# Patient Record
Sex: FEMALE | Race: WHITE | NOT HISPANIC OR LATINO | Employment: OTHER | URBAN - METROPOLITAN AREA
[De-identification: names, ages, dates, MRNs, and addresses within clinical notes are randomized per-mention and may not be internally consistent; named-entity substitution may affect disease eponyms.]

---

## 2025-03-26 PROBLEM — I50.32 CHRONIC HEART FAILURE WITH PRESERVED EJECTION FRACTION: Status: ACTIVE | Noted: 2023-06-22

## 2025-03-26 PROBLEM — I05.0 MITRAL VALVE STENOSIS, RHEUMATIC: Status: ACTIVE | Noted: 2025-03-26

## 2025-03-26 PROBLEM — E61.1 IRON DEFICIENCY: Status: ACTIVE | Noted: 2023-07-11

## 2025-03-26 PROBLEM — H93.19 TINNITUS: Status: ACTIVE | Noted: 2022-02-18

## 2025-03-26 PROBLEM — Z95.2 HISTORY OF MITRAL VALVE REPLACEMENT WITH MECHANICAL VALVE: Status: ACTIVE | Noted: 2021-12-13

## 2025-03-26 PROBLEM — R19.5 POSITIVE COLORECTAL CANCER SCREENING USING COLOGUARD TEST: Status: ACTIVE | Noted: 2023-07-11

## 2025-03-26 PROBLEM — E66.812 OBESITY, CLASS II, BMI 35-39.9: Status: ACTIVE | Noted: 2023-09-20

## 2025-03-26 PROBLEM — I51.9 HEART DISEASE: Status: ACTIVE | Noted: 2025-03-26

## 2025-03-26 PROBLEM — I36.1 TRICUSPID VALVE INSUFFICIENCY, NON-RHEUMATIC: Status: ACTIVE | Noted: 2023-06-22

## 2025-03-26 PROBLEM — I09.9 RHEUMATIC HEART DISEASE: Status: ACTIVE | Noted: 2025-03-26

## 2025-03-26 PROBLEM — G45.9 TIA (TRANSIENT ISCHEMIC ATTACK): Status: ACTIVE | Noted: 2025-03-26

## 2025-03-26 PROBLEM — N60.89 FLAT EPITHELIAL ATYPIA (FEA) OF BREAST: Status: ACTIVE | Noted: 2025-03-26

## 2025-03-26 PROBLEM — I87.2 VENOUS (PERIPHERAL) INSUFFICIENCY: Status: ACTIVE | Noted: 2023-06-22

## 2025-03-26 PROBLEM — R00.1 BRADYCARDIA: Status: ACTIVE | Noted: 2023-09-18

## 2025-03-26 PROBLEM — I48.19 PERSISTENT ATRIAL FIBRILLATION (MULTI): Status: ACTIVE | Noted: 2023-12-27

## 2025-03-26 PROBLEM — R92.8 MAMMOGRAM ABNORMAL: Status: ACTIVE | Noted: 2025-03-26

## 2025-03-26 RX ORDER — MULTIVITAMIN
1 TABLET ORAL
COMMUNITY

## 2025-03-26 RX ORDER — MOMETASONE FUROATE MONOHYDRATE 50 UG/1
2 SPRAY, METERED NASAL DAILY
COMMUNITY
Start: 2012-03-28 | End: 2025-03-30 | Stop reason: ENTERED-IN-ERROR

## 2025-03-26 RX ORDER — FLUTICASONE PROPIONATE 50 MCG
1 SPRAY, SUSPENSION (ML) NASAL DAILY
COMMUNITY
Start: 2024-10-28 | End: 2025-03-30 | Stop reason: ENTERED-IN-ERROR

## 2025-03-26 RX ORDER — CALCIUM CITRATE/VITAMIN D3 315MG-5MCG
1 TABLET ORAL DAILY
COMMUNITY

## 2025-03-26 RX ORDER — WARFARIN 3 MG/1
3 TABLET ORAL
COMMUNITY
Start: 2014-07-29

## 2025-03-26 RX ORDER — FLECAINIDE ACETATE 150 MG/1
1 TABLET ORAL EVERY 12 HOURS
COMMUNITY
Start: 2013-02-20 | End: 2025-03-27 | Stop reason: WASHOUT

## 2025-03-26 RX ORDER — DOFETILIDE 0.12 MG/1
125 CAPSULE ORAL EVERY 12 HOURS
COMMUNITY
Start: 2024-06-17

## 2025-03-26 RX ORDER — METOPROLOL SUCCINATE 50 MG/1
50 TABLET, EXTENDED RELEASE ORAL DAILY
COMMUNITY
End: 2025-03-30 | Stop reason: ENTERED-IN-ERROR

## 2025-03-26 RX ORDER — ROSUVASTATIN CALCIUM 5 MG/1
1 TABLET, COATED ORAL NIGHTLY
COMMUNITY
Start: 2024-11-04 | End: 2025-03-27 | Stop reason: WASHOUT

## 2025-03-26 RX ORDER — CALCIUM CARBONATE 300MG(750)
400 TABLET,CHEWABLE ORAL DAILY
COMMUNITY

## 2025-03-26 RX ORDER — SPIRONOLACTONE 25 MG/1
12.5 TABLET ORAL
COMMUNITY
Start: 2025-01-16

## 2025-03-27 ENCOUNTER — OFFICE VISIT (OUTPATIENT)
Dept: PRIMARY CARE | Facility: CLINIC | Age: 75
End: 2025-03-27
Payer: MEDICARE

## 2025-03-27 VITALS
DIASTOLIC BLOOD PRESSURE: 60 MMHG | SYSTOLIC BLOOD PRESSURE: 118 MMHG | HEIGHT: 66 IN | HEART RATE: 64 BPM | WEIGHT: 183 LBS | BODY MASS INDEX: 29.41 KG/M2

## 2025-03-27 DIAGNOSIS — I48.19 PERSISTENT ATRIAL FIBRILLATION (MULTI): ICD-10-CM

## 2025-03-27 DIAGNOSIS — R53.1 WEAKNESS: ICD-10-CM

## 2025-03-27 DIAGNOSIS — J32.9 CHRONIC SINUSITIS, UNSPECIFIED LOCATION: Primary | ICD-10-CM

## 2025-03-27 PROCEDURE — 1159F MED LIST DOCD IN RCRD: CPT | Performed by: FAMILY MEDICINE

## 2025-03-27 PROCEDURE — 3078F DIAST BP <80 MM HG: CPT | Performed by: FAMILY MEDICINE

## 2025-03-27 PROCEDURE — 99214 OFFICE O/P EST MOD 30 MIN: CPT | Performed by: FAMILY MEDICINE

## 2025-03-27 PROCEDURE — 1124F ACP DISCUSS-NO DSCNMKR DOCD: CPT | Performed by: FAMILY MEDICINE

## 2025-03-27 PROCEDURE — 3074F SYST BP LT 130 MM HG: CPT | Performed by: FAMILY MEDICINE

## 2025-03-27 RX ORDER — GLUCOSAMINE/CHONDR SU A SOD 750-600 MG
1 TABLET ORAL DAILY
COMMUNITY

## 2025-03-27 RX ORDER — GLUCOSAMINE/CHONDR SU A SOD 250-200 MG
1 TABLET ORAL DAILY
COMMUNITY

## 2025-03-27 RX ORDER — AZELASTINE 1 MG/ML
1 SPRAY, METERED NASAL 2 TIMES DAILY
Qty: 30 ML | Refills: 12 | Status: SHIPPED | OUTPATIENT
Start: 2025-03-27 | End: 2026-03-27

## 2025-03-27 RX ORDER — CETIRIZINE HYDROCHLORIDE 10 MG/1
10 TABLET ORAL DAILY
Qty: 30 TABLET | Refills: 5 | Status: SHIPPED | OUTPATIENT
Start: 2025-03-27 | End: 2025-09-23

## 2025-03-27 RX ORDER — MULTIVITAMIN
1 TABLET ORAL DAILY
COMMUNITY

## 2025-03-27 RX ORDER — CRANBERRY FRUIT 450 MG
1 TABLET ORAL DAILY
COMMUNITY

## 2025-03-27 ASSESSMENT — ENCOUNTER SYMPTOMS
HEADACHES: 0
DIZZINESS: 0
FEVER: 0
SHORTNESS OF BREATH: 0
ACTIVITY CHANGE: 0
FATIGUE: 0

## 2025-03-27 ASSESSMENT — PATIENT HEALTH QUESTIONNAIRE - PHQ9
2. FEELING DOWN, DEPRESSED OR HOPELESS: NOT AT ALL
1. LITTLE INTEREST OR PLEASURE IN DOING THINGS: NOT AT ALL
SUM OF ALL RESPONSES TO PHQ9 QUESTIONS 1 AND 2: 0

## 2025-03-27 NOTE — PROGRESS NOTES
"Subjective   Patient ID: Sruthi Cardenas is a 74 y.o. female who presents for New Patient Visit (Experiencing muscle weakness).    Weakness   - reports she has had issues with weakness ongoing for the last several weeks   - symptoms started in February after she had a sinus infection   - she had multiple rounds of antibiotics and a round of steroids   - after completing her second round of antibiotics she began to develop significant weakness   - the antibiotics gave her diarrhea, and she subsequently developed a UTI   - got an additional round of keflex, and has had resolution of urine and sinus symptoms   - reports she is still having persistent sinus pressure, and sinus pain around her eyes   - historically has used daily flonase to help manage her symptoms, but has been holding it for eye drops   - has had some gradual improvement in her weakness with time, but still dealing with some shortness of breath issues, and chest heaviness   - weakness is persistent, but she has some episodes that are worse then others   - does have some problems with post-nasal drip and cough   - has been sleeping poorly due to the congestion and difficulty with breathing          Review of Systems   Constitutional:  Negative for activity change, fatigue and fever.   Respiratory:  Negative for shortness of breath.    Cardiovascular:  Negative for chest pain.   Neurological:  Negative for dizziness and headaches.       Objective   /60   Pulse 64   Ht 1.664 m (5' 5.5\")   Wt 83 kg (183 lb)   BMI 29.99 kg/m²     Physical Exam  Constitutional:       Appearance: Normal appearance.   Cardiovascular:      Rate and Rhythm: Normal rate and regular rhythm.   Neurological:      Mental Status: She is alert.   Psychiatric:         Mood and Affect: Mood normal.         Behavior: Behavior normal.       Assessment/Plan   Problem List Items Addressed This Visit    None  Visit Diagnoses         Codes    Chronic sinusitis, unspecified " location    -  Primary J32.9    Relevant Medications    azelastine (Astelin) 137 mcg (0.1 %) nasal spray    cetirizine (ZyrTEC) 10 mg tablet    Weakness     R53.1    Relevant Orders    CBC and Auto Differential    Comprehensive metabolic panel    Vitamin D 25-Hydroxy,Total (for eval of Vitamin D levels)    Thyroid Stimulating Hormone    Body mass index (BMI) 30.0-30.9, adult     Z68.30    Relevant Orders    Vitamin D 25-Hydroxy,Total (for eval of Vitamin D levels)

## 2025-03-30 ENCOUNTER — APPOINTMENT (OUTPATIENT)
Dept: RADIOLOGY | Facility: HOSPITAL | Age: 75
DRG: 378 | End: 2025-03-30
Payer: MEDICARE

## 2025-03-30 ENCOUNTER — HOSPITAL ENCOUNTER (INPATIENT)
Facility: HOSPITAL | Age: 75
DRG: 378 | End: 2025-03-30
Attending: STUDENT IN AN ORGANIZED HEALTH CARE EDUCATION/TRAINING PROGRAM | Admitting: INTERNAL MEDICINE
Payer: MEDICARE

## 2025-03-30 VITALS
HEART RATE: 66 BPM | RESPIRATION RATE: 18 BRPM | SYSTOLIC BLOOD PRESSURE: 157 MMHG | HEIGHT: 67 IN | DIASTOLIC BLOOD PRESSURE: 70 MMHG | TEMPERATURE: 97.3 F | OXYGEN SATURATION: 98 % | BODY MASS INDEX: 27.78 KG/M2 | WEIGHT: 177 LBS

## 2025-03-30 DIAGNOSIS — D50.0 BLOOD LOSS ANEMIA: ICD-10-CM

## 2025-03-30 DIAGNOSIS — K92.2 ACUTE LOWER GASTROINTESTINAL HEMORRHAGE: Primary | ICD-10-CM

## 2025-03-30 LAB
25(OH)D3+25(OH)D2 SERPL-MCNC: 36 NG/ML (ref 30–100)
ABO GROUP (TYPE) IN BLOOD: NORMAL
ABO GROUP (TYPE) IN BLOOD: NORMAL
ALBUMIN SERPL BCP-MCNC: 3.7 G/DL (ref 3.4–5)
ALBUMIN SERPL-MCNC: 3.8 G/DL (ref 3.6–5.1)
ALP SERPL-CCNC: 55 U/L (ref 33–136)
ALP SERPL-CCNC: 60 U/L (ref 37–153)
ALT SERPL W P-5'-P-CCNC: 9 U/L (ref 7–45)
ALT SERPL-CCNC: 10 U/L (ref 6–29)
ANION GAP SERPL CALC-SCNC: 12 MMOL/L (ref 10–20)
ANION GAP SERPL CALCULATED.4IONS-SCNC: 8 MMOL/L (CALC) (ref 7–17)
ANTIBODY SCREEN: NORMAL
APTT PPP: 35 SECONDS (ref 26–36)
AST SERPL W P-5'-P-CCNC: 17 U/L (ref 9–39)
AST SERPL-CCNC: 18 U/L (ref 10–35)
BASOPHILS # BLD AUTO: 0.05 X10*3/UL (ref 0–0.1)
BASOPHILS # BLD AUTO: 39 CELLS/UL (ref 0–200)
BASOPHILS NFR BLD AUTO: 0.6 %
BASOPHILS NFR BLD AUTO: 1 %
BILIRUB SERPL-MCNC: 0.3 MG/DL (ref 0–1.2)
BILIRUB SERPL-MCNC: 0.4 MG/DL (ref 0.2–1.2)
BLOOD EXPIRATION DATE: NORMAL
BUN SERPL-MCNC: 25 MG/DL (ref 7–25)
BUN SERPL-MCNC: 26 MG/DL (ref 6–23)
CALCIUM SERPL-MCNC: 9 MG/DL (ref 8.6–10.3)
CALCIUM SERPL-MCNC: 9.2 MG/DL (ref 8.6–10.4)
CHLORIDE SERPL-SCNC: 106 MMOL/L (ref 98–107)
CHLORIDE SERPL-SCNC: 107 MMOL/L (ref 98–110)
CO2 SERPL-SCNC: 24 MMOL/L (ref 20–32)
CO2 SERPL-SCNC: 25 MMOL/L (ref 21–32)
CREAT SERPL-MCNC: 0.9 MG/DL (ref 0.5–1.05)
CREAT SERPL-MCNC: 0.9 MG/DL (ref 0.6–1)
DISPENSE STATUS: NORMAL
EGFRCR SERPLBLD CKD-EPI 2021: 66 ML/MIN/1.73M*2
EGFRCR SERPLBLD CKD-EPI 2021: 66 ML/MIN/1.73M2
EOSINOPHIL # BLD AUTO: 0.12 X10*3/UL (ref 0–0.4)
EOSINOPHIL # BLD AUTO: 117 CELLS/UL (ref 15–500)
EOSINOPHIL NFR BLD AUTO: 1.8 %
EOSINOPHIL NFR BLD AUTO: 2.5 %
ERYTHROCYTE [DISTWIDTH] IN BLOOD BY AUTOMATED COUNT: 17.7 % (ref 11–15)
ERYTHROCYTE [DISTWIDTH] IN BLOOD BY AUTOMATED COUNT: 19.2 % (ref 11.5–14.5)
GIANT PLATELETS BLD QL SMEAR: NORMAL
GLUCOSE SERPL-MCNC: 132 MG/DL (ref 65–139)
GLUCOSE SERPL-MCNC: 141 MG/DL (ref 74–99)
HCT VFR BLD AUTO: 20.2 % (ref 36–46)
HCT VFR BLD AUTO: 20.8 % (ref 35–45)
HGB BLD-MCNC: 5.5 G/DL (ref 12–16)
HGB BLD-MCNC: 5.6 G/DL (ref 11.7–15.5)
HYPOCHROMIA BLD QL SMEAR: NORMAL
IMM GRANULOCYTES # BLD AUTO: 0.03 X10*3/UL (ref 0–0.5)
IMM GRANULOCYTES NFR BLD AUTO: 0.6 % (ref 0–0.9)
INR PPP: 3.1 (ref 0.9–1.1)
IRON SATN MFR SERPL: 3 % (ref 25–45)
IRON SERPL-MCNC: 11 UG/DL (ref 35–150)
LYMPHOCYTES # BLD AUTO: 0.87 X10*3/UL (ref 0.8–3)
LYMPHOCYTES # BLD AUTO: 845 CELLS/UL (ref 850–3900)
LYMPHOCYTES NFR BLD AUTO: 13 %
LYMPHOCYTES NFR BLD AUTO: 17.9 %
MAGNESIUM SERPL-MCNC: 2.09 MG/DL (ref 1.6–2.4)
MCH RBC QN AUTO: 24.2 PG (ref 26–34)
MCH RBC QN AUTO: 24.7 PG (ref 27–33)
MCHC RBC AUTO-ENTMCNC: 26.9 G/DL (ref 32–36)
MCHC RBC AUTO-ENTMCNC: 27.2 G/DL (ref 32–36)
MCV RBC AUTO: 89 FL (ref 80–100)
MCV RBC AUTO: 91.6 FL (ref 80–100)
MONOCYTES # BLD AUTO: 0.49 X10*3/UL (ref 0.05–0.8)
MONOCYTES # BLD AUTO: 397 CELLS/UL (ref 200–950)
MONOCYTES NFR BLD AUTO: 10.1 %
MONOCYTES NFR BLD AUTO: 6.1 %
NEUTROPHILS # BLD AUTO: 3.3 X10*3/UL (ref 1.6–5.5)
NEUTROPHILS # BLD AUTO: 5103 CELLS/UL (ref 1500–7800)
NEUTROPHILS NFR BLD AUTO: 67.9 %
NEUTROPHILS NFR BLD AUTO: 78.5 %
NRBC BLD-RTO: 0 /100 WBCS (ref 0–0)
PLATELET # BLD AUTO: 332 X10*3/UL (ref 150–450)
PLATELET # BLD AUTO: 352 THOUSAND/UL (ref 140–400)
PMV BLD REES-ECKER: 10.2 FL (ref 7.5–12.5)
POLYCHROMASIA BLD QL SMEAR: NORMAL
POTASSIUM SERPL-SCNC: 4.1 MMOL/L (ref 3.5–5.3)
POTASSIUM SERPL-SCNC: 4.4 MMOL/L (ref 3.5–5.3)
PRODUCT BLOOD TYPE: 5100
PRODUCT CODE: NORMAL
PROT SERPL-MCNC: 6.6 G/DL (ref 6.1–8.1)
PROT SERPL-MCNC: 6.6 G/DL (ref 6.4–8.2)
PROTHROMBIN TIME: 33.9 SECONDS (ref 9.8–12.4)
RBC # BLD AUTO: 2.27 MILLION/UL (ref 3.8–5.1)
RBC # BLD AUTO: 2.27 X10*6/UL (ref 4–5.2)
RBC MORPH BLD: NORMAL
RH FACTOR (ANTIGEN D): NORMAL
RH FACTOR (ANTIGEN D): NORMAL
SERVICE CMNT-IMP: ABNORMAL
SODIUM SERPL-SCNC: 139 MMOL/L (ref 135–146)
SODIUM SERPL-SCNC: 139 MMOL/L (ref 136–145)
TIBC SERPL-MCNC: 376 UG/DL (ref 240–445)
TSH SERPL-ACNC: 1.18 MIU/L (ref 0.44–3.98)
TSH SERPL-ACNC: 1.4 MIU/L (ref 0.4–4.5)
UIBC SERPL-MCNC: 365 UG/DL (ref 110–370)
UNIT ABO: NORMAL
UNIT NUMBER: NORMAL
UNIT RH: NORMAL
UNIT VOLUME: 350
UNIT VOLUME: 500
WBC # BLD AUTO: 4.9 X10*3/UL (ref 4.4–11.3)
WBC # BLD AUTO: 6.5 THOUSAND/UL (ref 3.8–10.8)
XM INTEP: NORMAL

## 2025-03-30 PROCEDURE — 86850 RBC ANTIBODY SCREEN: CPT | Performed by: STUDENT IN AN ORGANIZED HEALTH CARE EDUCATION/TRAINING PROGRAM

## 2025-03-30 PROCEDURE — 85025 COMPLETE CBC W/AUTO DIFF WBC: CPT | Performed by: STUDENT IN AN ORGANIZED HEALTH CARE EDUCATION/TRAINING PROGRAM

## 2025-03-30 PROCEDURE — 80053 COMPREHEN METABOLIC PANEL: CPT

## 2025-03-30 PROCEDURE — 99285 EMERGENCY DEPT VISIT HI MDM: CPT | Performed by: STUDENT IN AN ORGANIZED HEALTH CARE EDUCATION/TRAINING PROGRAM

## 2025-03-30 PROCEDURE — 85610 PROTHROMBIN TIME: CPT

## 2025-03-30 PROCEDURE — 2550000001 HC RX 255 CONTRASTS: Performed by: INTERNAL MEDICINE

## 2025-03-30 PROCEDURE — 36415 COLL VENOUS BLD VENIPUNCTURE: CPT

## 2025-03-30 PROCEDURE — 84443 ASSAY THYROID STIM HORMONE: CPT | Performed by: INTERNAL MEDICINE

## 2025-03-30 PROCEDURE — 2500000001 HC RX 250 WO HCPCS SELF ADMINISTERED DRUGS (ALT 637 FOR MEDICARE OP): Performed by: INTERNAL MEDICINE

## 2025-03-30 PROCEDURE — 99285 EMERGENCY DEPT VISIT HI MDM: CPT | Mod: 25 | Performed by: STUDENT IN AN ORGANIZED HEALTH CARE EDUCATION/TRAINING PROGRAM

## 2025-03-30 PROCEDURE — 82746 ASSAY OF FOLIC ACID SERUM: CPT | Mod: STJLAB | Performed by: INTERNAL MEDICINE

## 2025-03-30 PROCEDURE — 1200000002 HC GENERAL ROOM WITH TELEMETRY DAILY

## 2025-03-30 PROCEDURE — 85730 THROMBOPLASTIN TIME PARTIAL: CPT

## 2025-03-30 PROCEDURE — 82607 VITAMIN B-12: CPT | Mod: STJLAB | Performed by: INTERNAL MEDICINE

## 2025-03-30 PROCEDURE — 99223 1ST HOSP IP/OBS HIGH 75: CPT | Performed by: INTERNAL MEDICINE

## 2025-03-30 PROCEDURE — 74177 CT ABD & PELVIS W/CONTRAST: CPT

## 2025-03-30 PROCEDURE — 86923 COMPATIBILITY TEST ELECTRIC: CPT

## 2025-03-30 PROCEDURE — 36430 TRANSFUSION BLD/BLD COMPNT: CPT

## 2025-03-30 PROCEDURE — 83735 ASSAY OF MAGNESIUM: CPT

## 2025-03-30 PROCEDURE — 83550 IRON BINDING TEST: CPT | Performed by: INTERNAL MEDICINE

## 2025-03-30 PROCEDURE — P9016 RBC LEUKOCYTES REDUCED: HCPCS

## 2025-03-30 PROCEDURE — 74177 CT ABD & PELVIS W/CONTRAST: CPT | Performed by: STUDENT IN AN ORGANIZED HEALTH CARE EDUCATION/TRAINING PROGRAM

## 2025-03-30 RX ORDER — ACETAMINOPHEN 325 MG/1
650 TABLET ORAL EVERY 6 HOURS PRN
Status: DISCONTINUED | OUTPATIENT
Start: 2025-03-30 | End: 2025-04-01 | Stop reason: HOSPADM

## 2025-03-30 RX ORDER — PROCHLORPERAZINE EDISYLATE 5 MG/ML
10 INJECTION INTRAMUSCULAR; INTRAVENOUS EVERY 6 HOURS PRN
Status: CANCELLED | OUTPATIENT
Start: 2025-03-30

## 2025-03-30 RX ORDER — ACETAMINOPHEN 160 MG/5ML
650 SOLUTION ORAL EVERY 6 HOURS PRN
Status: DISCONTINUED | OUTPATIENT
Start: 2025-03-30 | End: 2025-04-01 | Stop reason: HOSPADM

## 2025-03-30 RX ORDER — CETIRIZINE HYDROCHLORIDE 10 MG/1
10 TABLET ORAL DAILY
Status: DISCONTINUED | OUTPATIENT
Start: 2025-03-30 | End: 2025-04-01 | Stop reason: HOSPADM

## 2025-03-30 RX ORDER — POLYETHYLENE GLYCOL 3350 17 G/17G
17 POWDER, FOR SOLUTION ORAL DAILY PRN
Status: DISCONTINUED | OUTPATIENT
Start: 2025-03-30 | End: 2025-04-01 | Stop reason: HOSPADM

## 2025-03-30 RX ORDER — PROCHLORPERAZINE MALEATE 10 MG
10 TABLET ORAL EVERY 6 HOURS PRN
Status: CANCELLED | OUTPATIENT
Start: 2025-03-30

## 2025-03-30 RX ORDER — ONDANSETRON HYDROCHLORIDE 2 MG/ML
4 INJECTION, SOLUTION INTRAVENOUS EVERY 8 HOURS PRN
Status: DISCONTINUED | OUTPATIENT
Start: 2025-03-30 | End: 2025-03-31

## 2025-03-30 RX ORDER — WARFARIN 3 MG/1
1.5 TABLET ORAL 2 TIMES WEEKLY
COMMUNITY

## 2025-03-30 RX ORDER — GLUCOSAMINE/CHONDR SU A SOD 750-600 MG
1 TABLET ORAL DAILY
Status: DISCONTINUED | OUTPATIENT
Start: 2025-03-31 | End: 2025-04-01 | Stop reason: HOSPADM

## 2025-03-30 RX ORDER — PSYLLIUM HUSK 0.4 G
1 CAPSULE ORAL DAILY
Status: DISCONTINUED | OUTPATIENT
Start: 2025-03-31 | End: 2025-04-01 | Stop reason: HOSPADM

## 2025-03-30 RX ORDER — PANTOPRAZOLE SODIUM 40 MG/1
40 TABLET, DELAYED RELEASE ORAL
Status: DISCONTINUED | OUTPATIENT
Start: 2025-03-30 | End: 2025-04-01 | Stop reason: HOSPADM

## 2025-03-30 RX ORDER — LANOLIN ALCOHOL/MO/W.PET/CERES
400 CREAM (GRAM) TOPICAL DAILY
Status: DISCONTINUED | OUTPATIENT
Start: 2025-03-31 | End: 2025-04-01 | Stop reason: HOSPADM

## 2025-03-30 RX ORDER — FAMOTIDINE 20 MG/1
20 TABLET, FILM COATED ORAL NIGHTLY
Status: DISCONTINUED | OUTPATIENT
Start: 2025-03-30 | End: 2025-04-01 | Stop reason: HOSPADM

## 2025-03-30 RX ORDER — VIT C/E/ZN/COPPR/LUTEIN/ZEAXAN 250MG-90MG
500 CAPSULE ORAL DAILY
Status: DISCONTINUED | OUTPATIENT
Start: 2025-03-31 | End: 2025-04-01 | Stop reason: HOSPADM

## 2025-03-30 RX ORDER — AZELASTINE 1 MG/ML
1 SPRAY, METERED NASAL 2 TIMES DAILY
Status: DISCONTINUED | OUTPATIENT
Start: 2025-03-30 | End: 2025-04-01 | Stop reason: HOSPADM

## 2025-03-30 RX ORDER — SPIRONOLACTONE 25 MG/1
12.5 TABLET ORAL
Status: DISCONTINUED | OUTPATIENT
Start: 2025-03-31 | End: 2025-04-01 | Stop reason: HOSPADM

## 2025-03-30 RX ORDER — PROCHLORPERAZINE 25 MG/1
25 SUPPOSITORY RECTAL EVERY 12 HOURS PRN
Status: CANCELLED | OUTPATIENT
Start: 2025-03-30

## 2025-03-30 RX ORDER — ONDANSETRON 4 MG/1
4 TABLET, FILM COATED ORAL EVERY 8 HOURS PRN
Status: DISCONTINUED | OUTPATIENT
Start: 2025-03-30 | End: 2025-03-31

## 2025-03-30 RX ORDER — GUAIFENESIN 600 MG/1
600 TABLET, EXTENDED RELEASE ORAL EVERY 12 HOURS PRN
Status: DISCONTINUED | OUTPATIENT
Start: 2025-03-30 | End: 2025-04-01 | Stop reason: HOSPADM

## 2025-03-30 RX ORDER — ACETAMINOPHEN 650 MG/1
650 SUPPOSITORY RECTAL EVERY 6 HOURS PRN
Status: DISCONTINUED | OUTPATIENT
Start: 2025-03-30 | End: 2025-04-01 | Stop reason: HOSPADM

## 2025-03-30 RX ORDER — VIT C/E/ZN/COPPR/LUTEIN/ZEAXAN 250MG-90MG
500 CAPSULE ORAL DAILY
COMMUNITY

## 2025-03-30 RX ORDER — DOFETILIDE 0.12 MG/1
125 CAPSULE ORAL EVERY 12 HOURS
Status: DISCONTINUED | OUTPATIENT
Start: 2025-03-30 | End: 2025-04-01 | Stop reason: HOSPADM

## 2025-03-30 RX ADMIN — CETIRIZINE HYDROCHLORIDE 10 MG: 10 TABLET, FILM COATED ORAL at 16:54

## 2025-03-30 RX ADMIN — AZELASTINE HYDROCHLORIDE 1 SPRAY: 137 SPRAY, METERED NASAL at 20:45

## 2025-03-30 RX ADMIN — IOHEXOL 75 ML: 350 INJECTION, SOLUTION INTRAVENOUS at 18:10

## 2025-03-30 RX ADMIN — FAMOTIDINE 20 MG: 20 TABLET, FILM COATED ORAL at 22:40

## 2025-03-30 RX ADMIN — DOFETILIDE 125 MCG: 0.12 CAPSULE ORAL at 22:40

## 2025-03-30 RX ADMIN — PANTOPRAZOLE SODIUM 40 MG: 40 TABLET, DELAYED RELEASE ORAL at 20:45

## 2025-03-30 SDOH — SOCIAL STABILITY: SOCIAL INSECURITY: HAVE YOU HAD ANY THOUGHTS OF HARMING ANYONE ELSE?: NO

## 2025-03-30 SDOH — SOCIAL STABILITY: SOCIAL INSECURITY: ARE THERE ANY APPARENT SIGNS OF INJURIES/BEHAVIORS THAT COULD BE RELATED TO ABUSE/NEGLECT?: NO

## 2025-03-30 SDOH — SOCIAL STABILITY: SOCIAL INSECURITY: WITHIN THE LAST YEAR, HAVE YOU BEEN AFRAID OF YOUR PARTNER OR EX-PARTNER?: NO

## 2025-03-30 SDOH — SOCIAL STABILITY: SOCIAL INSECURITY
WITHIN THE LAST YEAR, HAVE YOU BEEN RAPED OR FORCED TO HAVE ANY KIND OF SEXUAL ACTIVITY BY YOUR PARTNER OR EX-PARTNER?: NO

## 2025-03-30 SDOH — ECONOMIC STABILITY: FOOD INSECURITY: WITHIN THE PAST 12 MONTHS, THE FOOD YOU BOUGHT JUST DIDN'T LAST AND YOU DIDN'T HAVE MONEY TO GET MORE.: NEVER TRUE

## 2025-03-30 SDOH — SOCIAL STABILITY: SOCIAL INSECURITY: HAS ANYONE EVER THREATENED TO HURT YOUR FAMILY OR YOUR PETS?: NO

## 2025-03-30 SDOH — SOCIAL STABILITY: SOCIAL INSECURITY
WITHIN THE LAST YEAR, HAVE YOU BEEN KICKED, HIT, SLAPPED, OR OTHERWISE PHYSICALLY HURT BY YOUR PARTNER OR EX-PARTNER?: NO

## 2025-03-30 SDOH — SOCIAL STABILITY: SOCIAL INSECURITY: WITHIN THE LAST YEAR, HAVE YOU BEEN HUMILIATED OR EMOTIONALLY ABUSED IN OTHER WAYS BY YOUR PARTNER OR EX-PARTNER?: NO

## 2025-03-30 SDOH — ECONOMIC STABILITY: HOUSING INSECURITY: IN THE LAST 12 MONTHS, WAS THERE A TIME WHEN YOU WERE NOT ABLE TO PAY THE MORTGAGE OR RENT ON TIME?: NO

## 2025-03-30 SDOH — ECONOMIC STABILITY: HOUSING INSECURITY: AT ANY TIME IN THE PAST 12 MONTHS, WERE YOU HOMELESS OR LIVING IN A SHELTER (INCLUDING NOW)?: NO

## 2025-03-30 SDOH — ECONOMIC STABILITY: FOOD INSECURITY: HOW HARD IS IT FOR YOU TO PAY FOR THE VERY BASICS LIKE FOOD, HOUSING, MEDICAL CARE, AND HEATING?: NOT HARD AT ALL

## 2025-03-30 SDOH — ECONOMIC STABILITY: HOUSING INSECURITY: IN THE PAST 12 MONTHS, HOW MANY TIMES HAVE YOU MOVED WHERE YOU WERE LIVING?: 1

## 2025-03-30 SDOH — SOCIAL STABILITY: SOCIAL INSECURITY: DO YOU FEEL ANYONE HAS EXPLOITED OR TAKEN ADVANTAGE OF YOU FINANCIALLY OR OF YOUR PERSONAL PROPERTY?: NO

## 2025-03-30 SDOH — ECONOMIC STABILITY: INCOME INSECURITY: IN THE PAST 12 MONTHS HAS THE ELECTRIC, GAS, OIL, OR WATER COMPANY THREATENED TO SHUT OFF SERVICES IN YOUR HOME?: NO

## 2025-03-30 SDOH — SOCIAL STABILITY: SOCIAL INSECURITY: DOES ANYONE TRY TO KEEP YOU FROM HAVING/CONTACTING OTHER FRIENDS OR DOING THINGS OUTSIDE YOUR HOME?: NO

## 2025-03-30 SDOH — SOCIAL STABILITY: SOCIAL INSECURITY: HAVE YOU HAD THOUGHTS OF HARMING ANYONE ELSE?: NO

## 2025-03-30 SDOH — ECONOMIC STABILITY: TRANSPORTATION INSECURITY: IN THE PAST 12 MONTHS, HAS LACK OF TRANSPORTATION KEPT YOU FROM MEDICAL APPOINTMENTS OR FROM GETTING MEDICATIONS?: NO

## 2025-03-30 SDOH — SOCIAL STABILITY: SOCIAL INSECURITY: ARE YOU OR HAVE YOU BEEN THREATENED OR ABUSED PHYSICALLY, EMOTIONALLY, OR SEXUALLY BY ANYONE?: NO

## 2025-03-30 SDOH — SOCIAL STABILITY: SOCIAL INSECURITY: WERE YOU ABLE TO COMPLETE ALL THE BEHAVIORAL HEALTH SCREENINGS?: YES

## 2025-03-30 SDOH — ECONOMIC STABILITY: FOOD INSECURITY: WITHIN THE PAST 12 MONTHS, YOU WORRIED THAT YOUR FOOD WOULD RUN OUT BEFORE YOU GOT THE MONEY TO BUY MORE.: NEVER TRUE

## 2025-03-30 SDOH — SOCIAL STABILITY: SOCIAL INSECURITY: ABUSE: ADULT

## 2025-03-30 SDOH — SOCIAL STABILITY: SOCIAL INSECURITY: DO YOU FEEL UNSAFE GOING BACK TO THE PLACE WHERE YOU ARE LIVING?: NO

## 2025-03-30 ASSESSMENT — COGNITIVE AND FUNCTIONAL STATUS - GENERAL
PATIENT BASELINE BEDBOUND: NO
MOBILITY SCORE: 24
DAILY ACTIVITIY SCORE: 24
DAILY ACTIVITIY SCORE: 24
MOBILITY SCORE: 24

## 2025-03-30 ASSESSMENT — ACTIVITIES OF DAILY LIVING (ADL)
PATIENT'S MEMORY ADEQUATE TO SAFELY COMPLETE DAILY ACTIVITIES?: YES
ADEQUATE_TO_COMPLETE_ADL: YES
HEARING - RIGHT EAR: FUNCTIONAL
DRESSING YOURSELF: INDEPENDENT
BATHING: INDEPENDENT
LACK_OF_TRANSPORTATION: NO
WALKS IN HOME: INDEPENDENT
HEARING - LEFT EAR: FUNCTIONAL
FEEDING YOURSELF: INDEPENDENT
TOILETING: INDEPENDENT
LACK_OF_TRANSPORTATION: NO
JUDGMENT_ADEQUATE_SAFELY_COMPLETE_DAILY_ACTIVITIES: YES
GROOMING: INDEPENDENT

## 2025-03-30 ASSESSMENT — ENCOUNTER SYMPTOMS
FEVER: 0
NAUSEA: 0
ABDOMINAL DISTENTION: 0
SHORTNESS OF BREATH: 1
DIARRHEA: 1
MUSCULOSKELETAL NEGATIVE: 1
ANAL BLEEDING: 1
COUGH: 0
NEUROLOGICAL NEGATIVE: 1
PSYCHIATRIC NEGATIVE: 1
ACTIVITY CHANGE: 1
BLOOD IN STOOL: 0
VOMITING: 0
PALPITATIONS: 0
ABDOMINAL PAIN: 0
CHILLS: 0

## 2025-03-30 ASSESSMENT — PATIENT HEALTH QUESTIONNAIRE - PHQ9
1. LITTLE INTEREST OR PLEASURE IN DOING THINGS: NOT AT ALL
SUM OF ALL RESPONSES TO PHQ9 QUESTIONS 1 & 2: 0
2. FEELING DOWN, DEPRESSED OR HOPELESS: NOT AT ALL

## 2025-03-30 ASSESSMENT — COLUMBIA-SUICIDE SEVERITY RATING SCALE - C-SSRS
1. IN THE PAST MONTH, HAVE YOU WISHED YOU WERE DEAD OR WISHED YOU COULD GO TO SLEEP AND NOT WAKE UP?: NO
2. HAVE YOU ACTUALLY HAD ANY THOUGHTS OF KILLING YOURSELF?: NO
6. HAVE YOU EVER DONE ANYTHING, STARTED TO DO ANYTHING, OR PREPARED TO DO ANYTHING TO END YOUR LIFE?: NO

## 2025-03-30 ASSESSMENT — LIFESTYLE VARIABLES
EVER FELT BAD OR GUILTY ABOUT YOUR DRINKING: NO
AUDIT-C TOTAL SCORE: 0
HOW OFTEN DO YOU HAVE A DRINK CONTAINING ALCOHOL: NEVER
AUDIT-C TOTAL SCORE: 0
HAVE YOU EVER FELT YOU SHOULD CUT DOWN ON YOUR DRINKING: NO
TOTAL SCORE: 0
HOW OFTEN DO YOU HAVE 6 OR MORE DRINKS ON ONE OCCASION: NEVER
EVER HAD A DRINK FIRST THING IN THE MORNING TO STEADY YOUR NERVES TO GET RID OF A HANGOVER: NO
HAVE PEOPLE ANNOYED YOU BY CRITICIZING YOUR DRINKING: NO
SKIP TO QUESTIONS 9-10: 1
HOW MANY STANDARD DRINKS CONTAINING ALCOHOL DO YOU HAVE ON A TYPICAL DAY: PATIENT DOES NOT DRINK

## 2025-03-30 ASSESSMENT — PAIN SCALES - GENERAL
PAINLEVEL_OUTOF10: 0 - NO PAIN

## 2025-03-30 ASSESSMENT — PAIN - FUNCTIONAL ASSESSMENT: PAIN_FUNCTIONAL_ASSESSMENT: 0-10

## 2025-03-30 NOTE — CARE PLAN
Pt to have STAT CT scan of abdomen. Per ER, call CT scan when first bag of blood is done, Called CT scan as first bag is almost complete. They state they will send for pt now.   Blood complete at 1807. VS complete after.   1814 Pt down to CT scan abdomen.   1845 2nd unit of blood started.

## 2025-03-30 NOTE — H&P
"History Of Present Illness  Sruthi Cardenas is a 74 y.o. female with history of atrial fibrillation on Coumadin, presents to [facility] ER. She went to PCP yesterday for chief complaint of weakness/fatigue and exertional shortness of breath. They obtained labs and resulted today calling her to come for ED for further evaluation with hemoglobin of 5.6. She states over the past month she has had a sinus infection and was placed on 2 antibiotics. First she was placed on 500 mg Amoxicillin and it did not completely resolve her sinus infection. She then got a UTI and was placed on Keflex which resolved both UTI and sinus infection. She did experience diarrhea after the antibiotics and states at times 5-6 loose bowel movements. She states wiping cause the skin to bleed and would notice blood on the tissue patient, but denies any blood in the stool or black stools. No nausea or vomiting. Denies any chest pain or abd pain. She does endorse exertional shortness of breath in the past week, stating she would walk up a flight of steps at home and would need to sit down and rest to recover. On arrival to ED, CBC obtained with hemoglobin of 5.5. Type and screen completed and order placed for 2 uPRBC.  INR 3.1. Gastroenterology placed on consult along with cardiology as patient takes Coumadin with goal INR 2.5-3.5 for mitral valve replacement. She does indicate she seen cardiology and on Thursday was given \"clean bill of health regarding her heart valve.\"      Past Medical History  Atrial fibrillation (HCC) 2003   Family history of breast cancer pt is on evista   Iron deficiency anemia 11/20/2014   Mixed hyperlipidemia   one elevated reading per patient report   mitral stenosis status post MVR   Prediabetes 11/20/2014   Rheum heart dis NEC/NOS   rheumatic fever   Unspecified transient cerebral ischemia 2001   presented with slurred speech   s/p PVI and flutter ablation in 9/2023.   Surgical History  BREAST " BIOPSY   benign. calcifications   LEFT HEART CATH,PERCUTANEOUS 2001   Dr. Atkinson   REPLACEMENT MITRAL VALVE W/CARDIOPULMONARY BYP 2001   Mitral valve replacement      Social History  Current packs/day: 1.00   Average packs/day: 1 pack/day for 25.0 years (25.0 ttl pk-yrs)   Types: Cigarettes   Smokeless tobacco: Never   Tobacco comments:   quit 1989   Vaping Use   Vaping status: Never Used   Substance Use Topics   Alcohol use: Yes   Comment: one glass of wine per week max     Family History  Family History   Problem Relation Name Age of Onset    Kidney failure Mother      Stroke Father      Breast cancer Sister          Allergies  Patient has no known allergies.    Review of Systems   Constitutional:  Positive for activity change. Negative for chills and fever.   HENT: Negative.     Respiratory:  Positive for shortness of breath. Negative for cough.    Cardiovascular:  Negative for chest pain, palpitations and leg swelling.   Gastrointestinal:  Positive for anal bleeding and diarrhea. Negative for abdominal distention, abdominal pain, blood in stool, nausea and vomiting.   Genitourinary: Negative.    Musculoskeletal: Negative.    Skin:  Positive for pallor.   Neurological: Negative.    Psychiatric/Behavioral: Negative.        ROS: 12 systems reviewed and negative except per HPI above     Physical Exam by System:     Constitutional: Well developed, awake/alert/oriented x3, no distress, alert and cooperative   ENMT: pale appearing, mucous membranes moist   Head/Neck: Neck supple   Respiratory/Thorax: Patent airways, CTAB, normal breath sounds with good chest expansion, thorax symmetric   Cardiovascular: Regular, rate and rhythm, no murmurs, 2+ equal pulses of the extremities, normal S 1and S 2   Gastrointestinal: Nondistended, soft, non-tender, no rebound tenderness or guarding, no masses palpable, no organomegaly, +BS, no bruits   Musculoskeletal: ROM intact, no joint swelling, normal strength   Extremities: normal  "extremities, no cyanosis edema, contusions or wounds, no clubbing   Neurological: alert and oriented x3, intact senses, motor, response and reflexes, normal strength       Last Recorded Vitals  Blood pressure 161/69, pulse 64, temperature 36.7 °C (98.1 °F), temperature source Temporal, resp. rate 20, height 1.702 m (5' 7\"), weight 80.3 kg (177 lb), SpO2 97%.    Relevant Results  Results for orders placed or performed during the hospital encounter of 03/30/25 (from the past 24 hours)   CBC and Auto Differential   Result Value Ref Range    WBC 4.9 4.4 - 11.3 x10*3/uL    nRBC 0.0 0.0 - 0.0 /100 WBCs    RBC 2.27 (L) 4.00 - 5.20 x10*6/uL    Hemoglobin 5.5 (LL) 12.0 - 16.0 g/dL    Hematocrit 20.2 (L) 36.0 - 46.0 %    MCV 89 80 - 100 fL    MCH 24.2 (L) 26.0 - 34.0 pg    MCHC 27.2 (L) 32.0 - 36.0 g/dL    RDW 19.2 (H) 11.5 - 14.5 %    Platelets 332 150 - 450 x10*3/uL    Neutrophils % 67.9 40.0 - 80.0 %    Immature Granulocytes %, Automated 0.6 0.0 - 0.9 %    Lymphocytes % 17.9 13.0 - 44.0 %    Monocytes % 10.1 2.0 - 10.0 %    Eosinophils % 2.5 0.0 - 6.0 %    Basophils % 1.0 0.0 - 2.0 %    Neutrophils Absolute 3.30 1.60 - 5.50 x10*3/uL    Immature Granulocytes Absolute, Automated 0.03 0.00 - 0.50 x10*3/uL    Lymphocytes Absolute 0.87 0.80 - 3.00 x10*3/uL    Monocytes Absolute 0.49 0.05 - 0.80 x10*3/uL    Eosinophils Absolute 0.12 0.00 - 0.40 x10*3/uL    Basophils Absolute 0.05 0.00 - 0.10 x10*3/uL   Type and Screen   Result Value Ref Range    ABO TYPE O     Rh TYPE POS     ANTIBODY SCREEN NEG    Comprehensive metabolic panel   Result Value Ref Range    Glucose 141 (H) 74 - 99 mg/dL    Sodium 139 136 - 145 mmol/L    Potassium 4.1 3.5 - 5.3 mmol/L    Chloride 106 98 - 107 mmol/L    Bicarbonate 25 21 - 32 mmol/L    Anion Gap 12 10 - 20 mmol/L    Urea Nitrogen 26 (H) 6 - 23 mg/dL    Creatinine 0.90 0.50 - 1.05 mg/dL    eGFR 66 >60 mL/min/1.73m*2    Calcium 9.0 8.6 - 10.3 mg/dL    Albumin 3.7 3.4 - 5.0 g/dL    Alkaline Phosphatase " 55 33 - 136 U/L    Total Protein 6.6 6.4 - 8.2 g/dL    AST 17 9 - 39 U/L    Bilirubin, Total 0.3 0.0 - 1.2 mg/dL    ALT 9 7 - 45 U/L   Magnesium   Result Value Ref Range    Magnesium 2.09 1.60 - 2.40 mg/dL   Morphology   Result Value Ref Range    RBC Morphology See Below     Polychromasia Mild     Hypochromia Mild     Giant Platelets Few    Protime-INR   Result Value Ref Range    Protime 33.9 (H) 9.8 - 12.4 seconds    INR 3.1 (H) 0.9 - 1.1   aPTT   Result Value Ref Range    aPTT 35 26 - 36 seconds      Scheduled medications    Continuous medications    PRN medications       No results found.        Assessment/Plan     #Acute on chronic Anemia  #History of iron deficiency anemia  #History of atrial fibrillation on Coumadin  #History of mitral valve replacement  ? GI bleed  -recent use of amoxicillin and Keflex    Plan:    -Admit to inpatient with tele  -Fall risk  -Type and screen completed  -Iron and TIBC  -Ordered 2 uPRBC, will obtain H and H one hour post 2nd unit transfusion  -Cardiology placed on consult with history  of mitral valve replacement and present anemia  -Gastroenterology placed on consult for possible GI bleed  -CT abd and pelvis pending  -Protonix 40 mg BID  -Regular diet then NPO after midnight  -Am labs including cbc, cmp, mag TSH, B12  -POC discussed with patient and attending  -PT OT  -Dispo: pending clinical course and consultant recommendations, likely require > 2 midnight stays to adequately treat and safe dc plan    Code status: Full Code    I spent >50 minutes in the professional and overall care of this patient.    SIGNATURE: KELVIN Beckwith-CNP PATIENT NAME: Sruthi Cardenas   DATE: March 30, 2025 MRN: [id number]   TIME: 3:15 PM

## 2025-03-31 ENCOUNTER — TELEPHONE (OUTPATIENT)
Dept: PRIMARY CARE | Facility: CLINIC | Age: 75
End: 2025-03-31
Payer: MEDICARE

## 2025-03-31 PROBLEM — I48.91 ATRIAL FIBRILLATION (MULTI): Status: ACTIVE | Noted: 2023-12-27

## 2025-03-31 PROBLEM — Z79.01 CHRONIC ANTICOAGULATION: Status: ACTIVE | Noted: 2025-03-31

## 2025-03-31 PROBLEM — D64.9 SEVERE ANEMIA: Status: ACTIVE | Noted: 2025-03-31

## 2025-03-31 PROBLEM — Z79.899 HIGH RISK MEDICATION USE: Status: ACTIVE | Noted: 2025-03-31

## 2025-03-31 PROBLEM — I10 HYPERTENSION: Status: ACTIVE | Noted: 2025-03-31

## 2025-03-31 LAB
ANION GAP SERPL CALC-SCNC: 10 MMOL/L (ref 10–20)
BASOPHILS # BLD AUTO: 0.07 X10*3/UL (ref 0–0.1)
BASOPHILS NFR BLD AUTO: 1 %
BUN SERPL-MCNC: 18 MG/DL (ref 6–23)
CALCIUM SERPL-MCNC: 8.6 MG/DL (ref 8.6–10.3)
CHLORIDE SERPL-SCNC: 107 MMOL/L (ref 98–107)
CO2 SERPL-SCNC: 24 MMOL/L (ref 21–32)
CREAT SERPL-MCNC: 0.84 MG/DL (ref 0.5–1.05)
EGFRCR SERPLBLD CKD-EPI 2021: 72 ML/MIN/1.73M*2
EOSINOPHIL # BLD AUTO: 0.24 X10*3/UL (ref 0–0.4)
EOSINOPHIL NFR BLD AUTO: 3.3 %
ERYTHROCYTE [DISTWIDTH] IN BLOOD BY AUTOMATED COUNT: 18.2 % (ref 11.5–14.5)
FOLATE SERPL-MCNC: 12.7 NG/ML
GLUCOSE SERPL-MCNC: 92 MG/DL (ref 74–99)
HCT VFR BLD AUTO: 26.7 % (ref 36–46)
HGB BLD-MCNC: 7.9 G/DL (ref 12–16)
IMM GRANULOCYTES # BLD AUTO: 0.02 X10*3/UL (ref 0–0.5)
IMM GRANULOCYTES NFR BLD AUTO: 0.3 % (ref 0–0.9)
INR PPP: 2.1 (ref 0.9–1.1)
LYMPHOCYTES # BLD AUTO: 1.51 X10*3/UL (ref 0.8–3)
LYMPHOCYTES NFR BLD AUTO: 20.5 %
MAGNESIUM SERPL-MCNC: 2.16 MG/DL (ref 1.6–2.4)
MCH RBC QN AUTO: 25.2 PG (ref 26–34)
MCHC RBC AUTO-ENTMCNC: 29.6 G/DL (ref 32–36)
MCV RBC AUTO: 85 FL (ref 80–100)
MONOCYTES # BLD AUTO: 0.56 X10*3/UL (ref 0.05–0.8)
MONOCYTES NFR BLD AUTO: 7.6 %
NEUTROPHILS # BLD AUTO: 4.96 X10*3/UL (ref 1.6–5.5)
NEUTROPHILS NFR BLD AUTO: 67.3 %
NRBC BLD-RTO: 0 /100 WBCS (ref 0–0)
PLATELET # BLD AUTO: 327 X10*3/UL (ref 150–450)
POTASSIUM SERPL-SCNC: 4.2 MMOL/L (ref 3.5–5.3)
PROTHROMBIN TIME: 23.2 SECONDS (ref 9.8–12.4)
RBC # BLD AUTO: 3.14 X10*6/UL (ref 4–5.2)
SODIUM SERPL-SCNC: 137 MMOL/L (ref 136–145)
VIT B12 SERPL-MCNC: 572 PG/ML (ref 211–911)
WBC # BLD AUTO: 7.4 X10*3/UL (ref 4.4–11.3)

## 2025-03-31 PROCEDURE — 2500000002 HC RX 250 W HCPCS SELF ADMINISTERED DRUGS (ALT 637 FOR MEDICARE OP, ALT 636 FOR OP/ED): Performed by: INTERNAL MEDICINE

## 2025-03-31 PROCEDURE — 99222 1ST HOSP IP/OBS MODERATE 55: CPT | Performed by: NURSE PRACTITIONER

## 2025-03-31 PROCEDURE — 99233 SBSQ HOSP IP/OBS HIGH 50: CPT | Performed by: INTERNAL MEDICINE

## 2025-03-31 PROCEDURE — 99222 1ST HOSP IP/OBS MODERATE 55: CPT | Performed by: INTERNAL MEDICINE

## 2025-03-31 PROCEDURE — 1200000002 HC GENERAL ROOM WITH TELEMETRY DAILY

## 2025-03-31 PROCEDURE — 2500000004 HC RX 250 GENERAL PHARMACY W/ HCPCS (ALT 636 FOR OP/ED): Performed by: INTERNAL MEDICINE

## 2025-03-31 PROCEDURE — 83735 ASSAY OF MAGNESIUM: CPT | Performed by: INTERNAL MEDICINE

## 2025-03-31 PROCEDURE — 80048 BASIC METABOLIC PNL TOTAL CA: CPT | Performed by: INTERNAL MEDICINE

## 2025-03-31 PROCEDURE — 85610 PROTHROMBIN TIME: CPT | Performed by: INTERNAL MEDICINE

## 2025-03-31 PROCEDURE — 2500000001 HC RX 250 WO HCPCS SELF ADMINISTERED DRUGS (ALT 637 FOR MEDICARE OP): Performed by: INTERNAL MEDICINE

## 2025-03-31 PROCEDURE — 85025 COMPLETE CBC W/AUTO DIFF WBC: CPT | Performed by: INTERNAL MEDICINE

## 2025-03-31 PROCEDURE — 36415 COLL VENOUS BLD VENIPUNCTURE: CPT | Performed by: INTERNAL MEDICINE

## 2025-03-31 RX ORDER — WARFARIN 3 MG/1
3 TABLET ORAL DAILY
Status: DISCONTINUED | OUTPATIENT
Start: 2025-03-31 | End: 2025-03-31

## 2025-03-31 RX ORDER — WARFARIN 3 MG/1
3 TABLET ORAL DAILY
Status: DISCONTINUED | OUTPATIENT
Start: 2025-04-01 | End: 2025-04-01 | Stop reason: HOSPADM

## 2025-03-31 RX ORDER — WARFARIN SODIUM 5 MG/1
5 TABLET ORAL ONCE
Status: COMPLETED | OUTPATIENT
Start: 2025-03-31 | End: 2025-03-31

## 2025-03-31 RX ADMIN — SODIUM CHLORIDE 300 MG: 9 INJECTION, SOLUTION INTRAVENOUS at 08:48

## 2025-03-31 RX ADMIN — DOFETILIDE 125 MCG: 0.12 CAPSULE ORAL at 21:58

## 2025-03-31 RX ADMIN — PANTOPRAZOLE SODIUM 40 MG: 40 TABLET, DELAYED RELEASE ORAL at 15:18

## 2025-03-31 RX ADMIN — Medication 1 TABLET: at 08:48

## 2025-03-31 RX ADMIN — FAMOTIDINE 20 MG: 20 TABLET, FILM COATED ORAL at 20:17

## 2025-03-31 RX ADMIN — CETIRIZINE HYDROCHLORIDE 10 MG: 10 TABLET, FILM COATED ORAL at 08:48

## 2025-03-31 RX ADMIN — Medication 400 MG: at 08:49

## 2025-03-31 RX ADMIN — AZELASTINE HYDROCHLORIDE 1 SPRAY: 137 SPRAY, METERED NASAL at 08:49

## 2025-03-31 RX ADMIN — CYANOCOBALAMIN TAB 500 MCG 500 MCG: 500 TAB at 08:49

## 2025-03-31 RX ADMIN — AZELASTINE HYDROCHLORIDE 1 SPRAY: 137 SPRAY, METERED NASAL at 20:18

## 2025-03-31 RX ADMIN — WARFARIN SODIUM 5 MG: 5 TABLET ORAL at 18:14

## 2025-03-31 RX ADMIN — DOFETILIDE 125 MCG: 0.12 CAPSULE ORAL at 10:40

## 2025-03-31 SDOH — HEALTH STABILITY: PHYSICAL HEALTH: ON AVERAGE, HOW MANY DAYS PER WEEK DO YOU ENGAGE IN MODERATE TO STRENUOUS EXERCISE (LIKE A BRISK WALK)?: 7 DAYS

## 2025-03-31 SDOH — SOCIAL STABILITY: SOCIAL NETWORK: HOW OFTEN DO YOU GET TOGETHER WITH FRIENDS OR RELATIVES?: ONCE A WEEK

## 2025-03-31 SDOH — SOCIAL STABILITY: SOCIAL NETWORK
DO YOU BELONG TO ANY CLUBS OR ORGANIZATIONS SUCH AS CHURCH GROUPS, UNIONS, FRATERNAL OR ATHLETIC GROUPS, OR SCHOOL GROUPS?: NO

## 2025-03-31 SDOH — HEALTH STABILITY: MENTAL HEALTH
DO YOU FEEL STRESS - TENSE, RESTLESS, NERVOUS, OR ANXIOUS, OR UNABLE TO SLEEP AT NIGHT BECAUSE YOUR MIND IS TROUBLED ALL THE TIME - THESE DAYS?: NOT AT ALL

## 2025-03-31 SDOH — HEALTH STABILITY: PHYSICAL HEALTH: ON AVERAGE, HOW MANY MINUTES DO YOU ENGAGE IN EXERCISE AT THIS LEVEL?: 10 MIN

## 2025-03-31 SDOH — SOCIAL STABILITY: SOCIAL NETWORK: HOW OFTEN DO YOU ATTEND MEETINGS OF THE CLUBS OR ORGANIZATIONS YOU BELONG TO?: NEVER

## 2025-03-31 SDOH — SOCIAL STABILITY: SOCIAL INSECURITY: ARE YOU MARRIED, WIDOWED, DIVORCED, SEPARATED, NEVER MARRIED, OR LIVING WITH A PARTNER?: DIVORCED

## 2025-03-31 SDOH — SOCIAL STABILITY: SOCIAL NETWORK
IN A TYPICAL WEEK, HOW MANY TIMES DO YOU TALK ON THE PHONE WITH FAMILY, FRIENDS, OR NEIGHBORS?: MORE THAN THREE TIMES A WEEK

## 2025-03-31 SDOH — SOCIAL STABILITY: SOCIAL NETWORK: HOW OFTEN DO YOU ATTEND CHURCH OR RELIGIOUS SERVICES?: NEVER

## 2025-03-31 ASSESSMENT — PAIN - FUNCTIONAL ASSESSMENT: PAIN_FUNCTIONAL_ASSESSMENT: 0-10

## 2025-03-31 ASSESSMENT — ENCOUNTER SYMPTOMS
ARTHRALGIAS: 1
SHORTNESS OF BREATH: 0
FATIGUE: 1
NEUROLOGICAL NEGATIVE: 1
PALPITATIONS: 0
SINUS PRESSURE: 1
ACTIVITY CHANGE: 1
PSYCHIATRIC NEGATIVE: 1

## 2025-03-31 ASSESSMENT — COGNITIVE AND FUNCTIONAL STATUS - GENERAL
DAILY ACTIVITIY SCORE: 24
MOBILITY SCORE: 24
DAILY ACTIVITIY SCORE: 24
MOBILITY SCORE: 24

## 2025-03-31 ASSESSMENT — PAIN SCALES - GENERAL
PAINLEVEL_OUTOF10: 0 - NO PAIN
PAINLEVEL_OUTOF10: 0 - NO PAIN

## 2025-03-31 NOTE — NURSING NOTE
Pt had no changes in condition throughout night. Pt tolerated all care given. Pt safety maintained at all times.

## 2025-03-31 NOTE — PROGRESS NOTES
Occupational Therapy                 Therapy Communication Note    Patient Name: Sruthi Cardenas  MRN: [id number]  Department: [facility]  Room: 3028/3028-A  Today's Date: 3/31/2025     Discipline: Occupational Therapy    Comment: Received orders for OT eval 3/30. Completed chart review, and OT screen this date. Pt has been up independent with ADL's and mobility. Pt does not have acute OT needs at this time, and OT services will be discharged.

## 2025-03-31 NOTE — CARE PLAN
The patient's goals for the shift include  remaining comfortable throughout night.     The clinical goals for the shift include see POC      Problem: Pain - Adult  Goal: Verbalizes/displays adequate comfort level or baseline comfort level  Outcome: Progressing     Problem: Safety - Adult  Goal: Free from fall injury  Outcome: Progressing     Problem: Discharge Planning  Goal: Discharge to home or other facility with appropriate resources  Outcome: Progressing     Problem: Chronic Conditions and Co-morbidities  Goal: Patient's chronic conditions and co-morbidity symptoms are monitored and maintained or improved  Outcome: Progressing     Problem: Nutrition  Goal: Nutrient intake appropriate for maintaining nutritional needs  Outcome: Progressing     Problem: Skin  Goal: Participates in plan/prevention/treatment measures  Outcome: Progressing  Goal: Prevent/manage excess moisture  Outcome: Progressing  Goal: Prevent/minimize sheer/friction injuries  Outcome: Progressing  Goal: Promote/optimize nutrition  Outcome: Progressing  Flowsheets (Taken 3/31/2025 0216)  Promote/optimize nutrition: Consume > 50% meals/supplements  Goal: Promote skin healing  Outcome: Progressing

## 2025-03-31 NOTE — PROGRESS NOTES
03/31/25 1517   Discharge Planning   Number of Stairs to Enter Residence 1   Number of Stairs Within Residence 15   Do you have animals or pets at home? No   Does the patient need discharge transport arranged? No   Stroke Family Assessment   Stroke Family Assessment Needed No   Intensity of Service   Intensity of Service 0-30 min     Introduced myself and my role.  States is leaving tomorrow.  Has a ride home  Independent with ADL and AIDL's. 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 Still drives. Denies that she needs an assistance. PCP is Dr. Be.  No home going needs.

## 2025-03-31 NOTE — ASSESSMENT & PLAN NOTE
Cardiology consulted due to patient having mechanical mitral valve  Follows up with [facility] cardiology

## 2025-03-31 NOTE — TELEPHONE ENCOUNTER
----- Message from Jose Be sent at 3/30/2025 11:39 AM EDT -----  Patient called and notified of low hemoglobin.  Recommended f/u in the ER for evaluation as soon as possible and patient was amenable.

## 2025-03-31 NOTE — ASSESSMENT & PLAN NOTE
CT abdomen pelvis suggesting esophageal irregularity  May be a component of esophagitis but ulcer could not be ruled out  Will continue with PPI twice a day while here and switch to daily on discharge  Will start patient back on Coumadin tonight  5 mg tonight and then 3 mg daily until INR is low end of 2.5  INR is down to 2.1 and she did not receive a dose yesterday  Status post 2 units of packed RBC  Patient will benefit from colonoscopy as outpatient as she never had 1

## 2025-03-31 NOTE — PROGRESS NOTES
Sruthi Cardenas is a 74 y.o. female on day 1 of admission presenting with Acute lower gastrointestinal hemorrhage.      Subjective   Doing well.  No complaints.  Talked about CT scan finding concerning for hiatal hernia with some esophageal irregularities.  Could potentially treat her medically and will discuss with GI and cardiology.  INR is 2.1 and will restart Coumadin tonight if okay by GI and cardiology       Objective     Last Recorded Vitals  /61 (BP Location: Left arm, Patient Position: Lying)   Pulse 67   Temp 36.3 °C (97.3 °F) (Temporal)   Resp 18   Wt 80.3 kg (177 lb)   SpO2 93%   Intake/Output last 3 Shifts:    Intake/Output Summary (Last 24 hours) at 3/31/2025 1041  Last data filed at 3/31/2025 0800  Gross per 24 hour   Intake 1004.58 ml   Output --   Net 1004.58 ml       Admission Weight  Weight: 80.3 kg (177 lb) (03/30/25 1310)    Daily Weight  03/30/25 : 80.3 kg (177 lb)      Physical Exam:  General: Not in acute distress, alert  HEENT: PERRLA, head intact and normocephalic  Neck: Normal to inspection  Lungs: Clear to auscultation, work of breathing within normal limit  Cardiac: Mechanical click noted.  Regular  Abdomen: Soft nontender, positive bowel sounds  : Exam deferred  Skin: Intact  Hematology: No petechia or excessive ecchymosis  Musculoskeletal: Without significant trauma  Neurological: Alert awake oriented, no focal deficit, cranial nerves grossly intact  Psych: No suicidal ideation or homicidal ideation    Relevant Results  Scheduled medications  azelastine, 1 spray, Each Nostril, BID  [Held by provider] biotin, 1 capsule, oral, Daily  calcium carbonate-vitamin D3, 1 tablet, oral, Daily  cetirizine, 10 mg, oral, Daily  cyanocobalamin, 500 mcg, oral, Daily  dofetilide, 125 mcg, oral, q12h  famotidine, 20 mg, oral, Nightly  iron sucrose, 300 mg, intravenous, Daily  magnesium oxide, 400 mg, oral, Daily  pantoprazole, 40 mg, oral, BID AC  spironolactone, 12.5 mg, oral,  Daily  warfarin, 3 mg, oral, Daily      Continuous medications     PRN medications  PRN medications: acetaminophen **OR** acetaminophen **OR** acetaminophen, benzocaine-menthol, guaiFENesin, ondansetron **OR** ondansetron, polyethylene glycol   Labs  Results from last 7 days   Lab Units 03/31/25  0726 03/30/25 1416 03/29/25  1130   WBC AUTO x10*3/uL 7.4 4.9  --    [facility] WBC AUTO Thousand/uL  --   --  6.5   HEMOGLOBIN g/dL 7.9* 5.5*  --    [facility] HEMOGLOBIN g/dL  --   --  5.6*   HEMATOCRIT % 26.7* 20.2*  --    [facility] HEMATOCRIT %  --   --  20.8*   PLATELETS AUTO x10*3/uL 327 332  --    [facility] PLATELETS AUTO Thousand/uL  --   --  352     Results from last 7 days   Lab Units 03/31/25 0726 03/30/25 1416 03/29/25  1130   [facility] SODIUM mmol/L  --   --  139   SODIUM mmol/L 137 139  --    [facility] POTASSIUM mmol/L  --   --  4.4   POTASSIUM mmol/L 4.2 4.1  --    [facility] CHLORIDE mmol/L  --   --  107   CHLORIDE mmol/L 107 106  --    [facility] CO2 mmol/L  --   --  24   CO2 mmol/L 24 25  --    BUN mg/dL 18 26*  --    [facility] BUN mg/dL  --   --  25   CREATININE mg/dL 0.84 0.90  --    [facility] CREATININE mg/dL  --   --  0.90   [facility] CALCIUM mg/dL  --   --  9.2   CALCIUM mg/dL 8.6 9.0  --    [facility] PROTEIN TOTAL g/dL  --   --  6.6   PROTEIN TOTAL g/dL  --  6.6  --    BILIRUBIN TOTAL mg/dL  --  0.3  --    [facility] BILIRUBIN TOTAL mg/dL  --   --  0.4   [facility] ALK PHOS U/L  --   --  60   ALK PHOS U/L  --  55  --    [facility] ALT U/L  --   --  10   ALT U/L  --  9  --    [facility] AST U/L  --   --  18   AST U/L  --  17  --    [facility] GLUCOSE mg/dL  --   --  132   GLUCOSE mg/dL 92 141*  --        Imaging  CT abdomen pelvis w IV contrast    Result Date: 3/30/2025  1. No significant retroperitoneal or peritoneal hematomas. 2. Tiny hiatal hernia with irregular wall thickening present about the gastroesophageal junction. Findings may be sequelae of esophagitis although underlying ulcer or neoplastic lesion is not excluded. Recommend endoscopy for further evaluation. 3.  Mild cardiomegaly with small bilateral pleural effusions and mild pulmonary edema. 4. Posterior displacement of the sacrococcygeal junction, likely a chronic finding but recommend correlation with pain on exam. 5. Cholelithiasis.   MACRO: Critical Finding:  See findings. Notification was initiated on 3/30/2025 at 7:18 pm by  Jose Tolbert.  (**-[facility]-**) Instructions:   Signed by: Jose Tolbert 3/30/2025 7:18 PM Dictation workstation:   [id number]     Cardiology, Vascular, and Other Imaging  No other imaging results found for the past 7 days                    Assessment/Plan   Sruthi Cardenas is a 74-year-old female with past medical history of atrial fibrillation/flutter, mitral valve replacement on Coumadin with goal of 2.5-3.5, iron deficiency anemia, hyperlipidemia, prediabetes, history of TIA presented to emergency department for abnormal labs and fatigue.  Patient was found to have hemoglobin of 5.6 on outpatient labs.  Assessment & Plan  Acute lower gastrointestinal hemorrhage  CT abdomen pelvis suggesting esophageal irregularity  May be a component of esophagitis but ulcer could not be ruled out  Will continue with PPI twice a day while here and switch to daily on discharge  Will start patient back on Coumadin tonight  5 mg tonight and then 3 mg daily until INR is low end of 2.5  INR is down to 2.1 and she did not receive a dose yesterday  Status post 2 units of packed RBC  Patient will benefit from colonoscopy as outpatient as she never had 1  H/O mitral valve replacement with mechanical valve  Cardiology consulted due to patient having mechanical mitral valve  Follows up with [facility] cardiology  Chronic anticoagulation  INR is 2.1  One-time dose of 5 mg of Coumadin  Will try to get low end of 2.5  Severe anemia  Has iron deficiency anemia  IV Venofer started  B12 and folate level are within normal limit  High risk medication use  On Coumadin  Hypertension  Continue with spironolactone  Atrial fibrillation  (Multi)  Telemetry  Continue with Tikosyn  Coumadin as ordered  Magnesium replacement       Plan discussed with patient at bedside    High level of MDM based on above issue and discussing plan    This note is created using voice recognition software. All efforts are made to minimize errors, if there are errors there due to transcription.    Alonso Wharton  Hospitalist

## 2025-03-31 NOTE — CONSULTS
"Reason for consult  Anemia with BRBPR    HPI  Sruthi Cardenas is a 74 y.o. female with PMH of A-fib on Coumadin for valve replacement presenting for low hemoglobin of 5.6 noted on PCP labs after reporting increased fatigue/weakness.  She incremented to 7.9 with 2units PRBCs after arrival.  Initial INR of 3.1, currently 2.1. She reports having diarrhea after being on antibiotics for UTI earlier this month with up to 5-6 loose bowel movements daily.  She reports having scant traces of blood on toilet paper intermittently that she feels is secondary to diarrhea, raw mucosa.  She denies CP, SOB, N/V, abdominal discomfort, hematochezia or melena, constipation. Patient is afebrile, HDS on RA.  She has not had any bowel movements or signs of overt bleeding since arrival.  She reports having a Cologuard that was positive for blood which she contributes to her heart disease, \"leaky heart\".  She has never had a colonoscopy as she has been told she should not have one due to being on Coumadin. She has been on long-term oral iron replacements.  She has never required a blood transfusion previously.    PMH  A-fib on Coumadin, rheumatic heart disease, HFpEF, positive Cologuard test, iron deficiency, basal cell carcinoma, TIA    PSH  She has a past surgical history that includes Aortic valve replacement (06/18/2014); Other surgical history (04/13/2015); Other surgical history (04/13/2015); and Other surgical history (04/13/2015).  Cardiac ablation, mitral valve replacement    Family  Family History   Problem Relation Name Age of Onset    Kidney failure Mother      Stroke Father      Breast cancer Sister         Social  She reports that she quit smoking about 40 years ago. Her smoking use included cigarettes. She has never used smokeless tobacco. She reports current alcohol use. She reports that she does not use drugs.      Review of Systems  ROS negative unless stated otherwise in HPI     Objective  /61 (BP Location: Left " "arm, Patient Position: Lying)   Pulse 67   Temp 36.3 °C (97.3 °F) (Temporal)   Resp 18   Ht 1.702 m (5' 7\")   Wt 80.3 kg (177 lb)   SpO2 93%   BMI 27.72 kg/m²     Physical Exam  Constitutional: Alert, pleasant and interactive, in NAD  Eyes: PERRL, sclera clear, no conjunctival injection  Skin: Warm and dry, no rash or ecchymosis  ENMT: Mucous membranes moist, no lesions noted  Resp: CTAB, even and unlabored  CV: RRR, normal S1, S2, no m,r,g  GI: +BS, soft, round, NT, no rebound tenderness or guarding, no palpable masses or organomegaly  Extremities: Extremities warm, no edema, contusions, wounds or cyanosis  Neuro: Alert and oriented x3  Psych: Appropriate mood and behavior    Medications  Scheduled medications  azelastine, 1 spray, Each Nostril, BID  [Held by provider] biotin, 1 capsule, oral, Daily  calcium carbonate-vitamin D3, 1 tablet, oral, Daily  cetirizine, 10 mg, oral, Daily  cyanocobalamin, 500 mcg, oral, Daily  dofetilide, 125 mcg, oral, q12h  famotidine, 20 mg, oral, Nightly  iron sucrose, 300 mg, intravenous, Daily  magnesium oxide, 400 mg, oral, Daily  pantoprazole, 40 mg, oral, BID AC  spironolactone, 12.5 mg, oral, Daily      Continuous medications     PRN medications  PRN medications: acetaminophen **OR** acetaminophen **OR** acetaminophen, benzocaine-menthol, guaiFENesin, ondansetron **OR** ondansetron, polyethylene glycol     Labs  Lab Results   Component Value Date    WBC 7.4 03/31/2025    HGB 7.9 (L) 03/31/2025    HCT 26.7 (L) 03/31/2025    MCV 85 03/31/2025     03/31/2025     Lab Results   Component Value Date    GLUCOSE 141 (H) 03/30/2025    CALCIUM 9.0 03/30/2025     03/30/2025    K 4.1 03/30/2025    CO2 25 03/30/2025     03/30/2025    BUN 26 (H) 03/30/2025    CREATININE 0.90 03/30/2025     Lab Results   Component Value Date    ALT 9 03/30/2025    AST 17 03/30/2025    ALKPHOS 55 03/30/2025    BILITOT 0.3 03/30/2025     Lab Results   Component Value Date    IRON 11 " (L) 03/30/2025    TIBC 376 03/30/2025     Lab Results   Component Value Date    INR 2.1 (H) 03/31/2025    INR 3.1 (H) 03/30/2025    PROTIME 23.2 (H) 03/31/2025    PROTIME 33.9 (H) 03/30/2025       Radiology  CT A/P with IV contrast 3/30/2025 noting:  IMPRESSION:  1. No significant retroperitoneal or peritoneal hematomas.  2. Tiny hiatal hernia with irregular wall thickening present about  the gastroesophageal junction. Findings may be sequelae of  esophagitis although underlying ulcer or neoplastic lesion is not  excluded. Recommend endoscopy for further evaluation.  3. Mild cardiomegaly with small bilateral pleural effusions and mild  pulmonary edema.  4. Posterior displacement of the sacrococcygeal junction, likely a  chronic finding but recommend correlation with pain on exam.  5. Cholelithiasis.      MACRO:  Critical Finding:  See findings. Notification was initiated on  3/30/2025 at 7:18 pm by  Jose Tolbert.  (**-YCF-**) Instructions:      Signed by: Jose Tolbert 3/30/2025 7:18 PM  Dictation workstation:   [id number]    Assessment:  Sruthi Cardenas is a 74 y.o. female with PMH of A-fib, mitral valve replacement on Coumadin presenting with anemia with hemoglobin 5.5 incrementing to 7.9 with 2 units PRBCs no overt signs of bleeding though patient reports specks of blood on TP intermittently.  Initial INR 3.1, now 2.1.  Patient on long-term oral iron replacement.  Patient currently declining any endoscopic intervention.    # acute anemia  # iron deficiency  # a fib/ MVR on Coumadin  # gastroesophgeal thickening on CT  # recent UTI on ATB  # positive cologuard test    Plan:  - continue supportive care  - diet as tolerated  - continue to trend hgb daily unless pt becomes symptomatic or decompensates  - transfuse to maintain hgb >7  - continue to monitor for and document overt signs of bleeding  - no NSAIDS  - agree with IV iron  - continue to hold Coumadin until concerns for bleeding subside, hgb remains  stable  - can give PPI daily  - pt currently declining endoscopic intervention or any invasive procedure  - pt can consider outpatient colonoscopy, EGD     Plan has been discussed with Dr. Trujillo. GI will sign off.     Naty Jacobo, APRN/CNP

## 2025-03-31 NOTE — CONSULTS
Inpatient consult to Cardiology  Consult performed by: Blanca Bunn MD  Consult ordered by: Alonso Wharton MD  Reason for consult: Mechanical mitral valve repair easement on chronic warfarin therapy now with severe anemia      Cardiology Consult Note      Date:   3/31/2025  Patient name:  Sruthi Cardenas  Date of admission:  3/30/2025  1:35 PM  MRN:   [id number]  YOB: 1950  Time of Consult:  9:09 AM    Consulting Cardiologist: Dr. Blanca Bunn        Primary Cardiologist:   [facility]: [place], Dressing     Referring Provider: Dr. Alonso Wharton      Admission Diagnosis:     Acute lower gastrointestinal hemorrhage    History of Present Illness:      Sruthi Cardenas is a 74 y.o.  female patient who is being at the request of Dr. Wharton for inpatient consultation of valvular disease. She was admitted on 3/30/2025.  Previous NOH and EHR records have been reviewed in detail.      Asked to see this patient for anticoagulation management in the setting of severe anemia.  The patient receives her cardiology clear at the [facility] with the physicians listed above.  She has a history of a mechanical mitral valve replacement and atrial fibrillation atrial flutter status post ablations.  She remains on antiarrhythmic therapy followed by electrophysiology.  She is on warfarin anticoagulation given her mechanical aortic valve and manages with home PT/INRs.  Her warfarin dose is 3 mg daily with 1.5 mg on Tuesday and Friday.    She has had problems with recurrent and persistent right sinus infection.  She was disappointed in the care she received with a [facility] physician so switched her primary care physician to [facility] which is why she is here at our facility.  She was found on lab work to be severely anemic and referred to the emergency room.  She has received transfusion and is currently receiving IV iron.  She has had no visible blood loss with no blood in the  urine or stool and has not had any dark or tarry stools.  She has a history of chronic anemia and usually takes an iron supplementation but she admits she has been very negligent in taking over the past 4 to 6 weeks as she has dealt with her sinus infections.  She has no history of diverticular bleed or ulcers.    Her cardiac history includes a mechanical mitral valve replacement with a HARISH #27 valve prosthesis.  She is known to have severe tricuspid valve regurgitation with mildly dilated RV by cardiac MRI.  She was recently evaluated for cardiac amyloid which was negative.  It was done because of some apical sparing on EKG strain imaging and other problems including her chronic anemia.  She did see hematology but more because of the concern for possible amyloid and bone marrow biopsy which she did not agree to have done.  Allergies:     No Known Allergies    Past Medical History:     History reviewed. No pertinent past medical history.    Past Surgical History:     Past Surgical History:   Procedure Laterality Date    AORTIC VALVE REPLACEMENT  2014    Aortic Valve Replacement    OTHER SURGICAL HISTORY  2015    Left Breast Biopsy During Breast Surgery    OTHER SURGICAL HISTORY  2015    Biopsy Breast Percutaneous Rotating Biopsy Device    OTHER SURGICAL HISTORY  2015    Right Breast Biopsy During Breast Surgery       Family History:     Family History   Problem Relation Name Age of Onset    Kidney failure Mother      Stroke Father      Breast cancer Sister         Social History:     Social History     Socioeconomic History    Marital status:    Tobacco Use    Smoking status: Former     Current packs/day: 0.00     Types: Cigarettes     Quit date:      Years since quittin.2    Smokeless tobacco: Never   Vaping Use    Vaping status: Never Used   Substance and Sexual Activity    Alcohol use: Yes     Comment: rarely    Drug use: Never     Social Drivers of Health     Financial  Resource Strain: Low Risk  (3/30/2025)    Overall Financial Resource Strain (CARDIA)     Difficulty of Paying Living Expenses: Not hard at all   Food Insecurity: No Food Insecurity (3/30/2025)    Hunger Vital Sign     Worried About Running Out of Food in the Last Year: Never true     Ran Out of Food in the Last Year: Never true   Transportation Needs: No Transportation Needs (3/30/2025)    PRAPARE - Transportation     Lack of Transportation (Medical): No     Lack of Transportation (Non-Medical): No   Physical Activity: Sufficiently Active (4/24/2024)    Received from [facility]    Exercise Vital Sign     Days of Exercise per Week: 7 days     Minutes of Exercise per Session: 60 min   Stress: No Stress Concern Present (4/24/2024)    Received from [facility]    [employer] - Occupational Stress Questionnaire     Feeling of Stress : Not at all   Social Connections: Socially Isolated (4/24/2024)    Received from [facility]    Social Connection and Isolation Panel [NHANES]     Frequency of Communication with Friends and Family: More than three times a week     Frequency of Social Gatherings with Friends and Family: Once a week     Attends [employer] Services: Never     Active Member of Clubs or Organizations: No     Marital Status:    Intimate Partner Violence: Not At Risk (3/30/2025)    Humiliation, Afraid, Rape, and Kick questionnaire     Fear of Current or Ex-Partner: No     Emotionally Abused: No     Physically Abused: No     Sexually Abused: No   Housing Stability: Low Risk  (3/30/2025)    Housing Stability Vital Sign     Unable to Pay for Housing in the Last Year: No     Number of Times Moved in the Last Year: 1     Homeless in the Last Year: No       Home Medications:     Prior to Admission medications    Medication Sig Start Date End Date Taking? Authorizing Provider   azelastine (Astelin) 137 mcg (0.1 %)  nasal spray Administer 1 spray into each nostril 2 times a day. Use in each nostril as directed 3/27/25 3/27/26 Yes Jose Be MD   biotin 2,500 mcg capsule Take 1 capsule (2.5 mg) by mouth once daily.   Yes Historical Provider, MD   calcium carbonate-vitamin D3 (Calcium 600 + D,3,) 600 mg-10 mcg (400 unit) tablet Take 1 tablet by mouth once daily.   Yes Historical Provider, MD   cetirizine (ZyrTEC) 10 mg tablet Take 1 tablet (10 mg) by mouth once daily. 3/27/25 9/23/25 Yes Jose Be MD   cranberry fruit (cranberry) 450 mg tablet Take 1 capsule by mouth once daily.   Yes Historical Provider, MD   cyanocobalamin (Vitamin B-12) 500 mcg tablet Take 1 tablet (500 mcg) by mouth once daily.   Yes Historical Provider, MD   dofetilide (Tikosyn) 125 mcg capsule Take 1 capsule (125 mcg) by mouth every 12 hours. 6/17/24  Yes Historical Provider, MD   ferrous sulfate, dried (Slow Release Iron) 160 mg (50 mg iron) ER tablet Take 1 tablet (47.5 mg) by mouth once daily.   Yes Historical Provider, MD   glucosamine-chondroitin (Osteo Bi-Flex) 250-200 mg tablet Take 1 tablet by mouth once daily.   Yes Historical Provider, MD   magnesium oxide (Mag-Ox) 400 mg tablet Take 1 tablet (400 mg) by mouth once daily.   Yes Historical Provider, MD   multivitamin tablet Take 1 tablet by mouth once daily.   Yes Historical Provider, MD   spironolactone (Aldactone) 25 mg tablet Take 0.5 tablets (12.5 mg) by mouth once daily. 1/16/25  Yes Historical Provider, MD   warfarin (Coumadin) 3 mg tablet Take 1 tablet (3 mg) by mouth 5 times a week. [facility] 7/29/14  Yes Historical Provider, MD   warfarin (Coumadin) 3 mg tablet Take 0.5 tablets (1.5 mg) by mouth 2 times a week. [facility]    Historical Provider, MD   flecainide (Tambocor) 150 mg tablet Take 1 tablet (150 mg) by mouth every 12 hours.  Patient not taking: Reported on 3/27/2025 2/20/13 3/27/25  Historical Provider, MD   fluticasone (Flonase) 50 mcg/actuation nasal spray 1 spray by Does not apply  route once daily.  Patient not taking: Reported on 3/27/2025 10/28/24 3/30/25  Historical Provider, MD   metoprolol succinate XL (Toprol XL) 50 mg 24 hr tablet Take 1 tablet (50 mg) by mouth once daily.  Patient not taking: Reported on 3/27/2025  3/30/25  Historical Provider, MD   mometasone (Nasonex) 50 mcg/actuation nasal spray Administer 2 sprays into affected nostril(s) once daily.  Patient not taking: Reported on 3/27/2025 3/28/12 3/30/25  Historical Provider, MD   rosuvastatin (Crestor) 5 mg tablet Take 1 tablet (5 mg) by mouth once daily at bedtime.  Patient not taking: Reported on 3/27/2025 11/4/24 3/27/25  Historical Provider, MD       Current Medications:     Current Outpatient Medications   Medication Instructions    azelastine (Astelin) 137 mcg (0.1 %) nasal spray 1 spray, Each Nostril, 2 times daily, Use in each nostril as directed    biotin 2,500 mcg capsule 1 capsule, oral, Daily    calcium carbonate-vitamin D3 (Calcium 600 + D,3,) 600 mg-10 mcg (400 unit) tablet 1 tablet, oral, Daily    cetirizine (ZYRTEC) 10 mg, oral, Daily    cranberry fruit (cranberry) 450 mg tablet 1 capsule, oral, Daily    cyanocobalamin (VITAMIN B-12) 500 mcg, Daily    dofetilide (TIKOSYN) 125 mcg, oral, Every 12 hours    ferrous sulfate, dried (Slow Release Iron) 160 mg (50 mg iron) ER tablet 1 tablet, oral, Daily    glucosamine-chondroitin (Osteo Bi-Flex) 250-200 mg tablet 1 tablet, oral, Daily    magnesium oxide (MAG-OX) 400 mg, oral, Daily    multivitamin tablet 1 tablet, oral, Daily RT    spironolactone (ALDACTONE) 12.5 mg, oral, Daily RT    warfarin (Coumadin) 3 mg tablet Take 1 tablet (3 mg) by mouth 5 times a week. Q[facility]    warfarin (COUMADIN) 1.5 mg, oral, 2 times weekly, QHS        IV Medications:          Review of Systems:      Review of Systems   Constitutional:  Positive for activity change and fatigue.   HENT:  Positive for congestion and sinus pressure (Right sinus pressure which is now recently been alleviated).     Eyes:  Positive for visual disturbance (Recent cataract surgery).   Respiratory:  Negative for shortness of breath.    Cardiovascular:  Negative for chest pain, palpitations and leg swelling.   Genitourinary: Negative.    Musculoskeletal:  Positive for arthralgias.   Neurological: Negative.    Psychiatric/Behavioral: Negative.     All other systems reviewed and are negative.      Vital Signs:     Vitals:    03/30/25 2230 03/31/25 0000 03/31/25 0400 03/31/25 0800   BP: 157/70 151/64 158/58 129/61   BP Location: Right arm Right arm Left arm Left arm   Patient Position: Lying Lying Lying Lying   Pulse: 66 81 60 67   Resp: 18 18 18 18   Temp: 36.3 °C (97.3 °F) 36.1 °C (97 °F) 36.5 °C (97.7 °F) 36.3 °C (97.3 °F)   TempSrc: Temporal Temporal Temporal Temporal   SpO2: 98% 96% 100% 93%   Weight:       Height:           Intake/Output Summary (Last 24 hours) at 3/31/2025 0909  Last data filed at 3/31/2025 0800  Gross per 24 hour   Intake 1004.58 ml   Output --   Net 1004.58 ml     Vitals:    03/30/25 1310   Weight: 80.3 kg (177 lb)       Physical Examination:     GENERAL APPEARANCE: Well developed, well nourished, in no acute distress.  HEENT: No gross abnormalities of conjunctiva, teeth, gums, oral mucosa  NECK: Supple, no JVD, no bruit. Thyroid not palpable. Carotid upstrokes normal.  CHEST: Symmetric and non-tender.  LUNGS: Clear to auscultation bilaterally; normal respiratory effort.  HEART: PMI is nondisplaced.  There is a regular rhythm with a mechanical S1 and normal S2.  No appreciable S3.  No appreciable murmur.  Distal perfusion is normal without carotid or abdominal bruits no peripheral edema.    ABDOMEN: Soft, nontender, no palpable hepatosplenomegaly, no mases, no bruits. Abdominal aorta not noted to be enlarged.  MUSCULOSKELETAL: Ambulatory with normal tandem gait.  EXTREMITIES: Warm with good color, no clubbing or cyanois. There is no edema noted.  PERIPHERAL VASCULAR: Pulses present and equally palpable; 2+  "throughout. No femoral bruits.  NEURO/PSHCY: Alert and oriented x3; appropriate behavior and responses and responses, no gross focal motor deficits   INTEGUMENT: Skin warm and dry, without gross excoriationis or lesions.      Lab:     CBC:   Lab Results   Component Value Date    WBC 7.4 03/31/2025    RBC 3.14 (L) 03/31/2025    HGB 7.9 (L) 03/31/2025    HCT 26.7 (L) 03/31/2025     03/31/2025        CMP:    Lab Results   Component Value Date     03/31/2025    K 4.2 03/31/2025     03/31/2025    CO2 24 03/31/2025    BUN 18 03/31/2025    CREATININE 0.84 03/31/2025    GLUCOSE 92 03/31/2025    CALCIUM 8.6 03/31/2025       Magnesium:    Lab Results   Component Value Date    MG 2.16 03/31/2025       Lipid Profile:    No results found for: \"CHLPL\", \"TRIG\", \"HDL\", \"LDLCALC\", \"LDLDIRECT\"    TSH:    Lab Results   Component Value Date    TSH 1.18 03/30/2025       BNP:   No results found for: \"BNP\"     PT/INR:    Lab Results   Component Value Date    PROTIME 23.2 (H) 03/31/2025    INR 2.1 (H) 03/31/2025       HgBA1c:    Lab Results   Component Value Date    HGBA1C 5.3 11/04/2024       BMP:  Lab Results   Component Value Date     03/31/2025     03/30/2025    K 4.2 03/31/2025    K 4.1 03/30/2025     03/31/2025     03/30/2025    CO2 24 03/31/2025    CO2 25 03/30/2025    BUN 18 03/31/2025    BUN 26 (H) 03/30/2025    CREATININE 0.84 03/31/2025    CREATININE 0.90 03/30/2025       CBC:  Lab Results   Component Value Date    WBC 7.4 03/31/2025    WBC 4.9 03/30/2025    RBC 3.14 (L) 03/31/2025    RBC 2.27 (L) 03/30/2025    HGB 7.9 (L) 03/31/2025    HGB 5.5 (LL) 03/30/2025    HCT 26.7 (L) 03/31/2025    HCT 20.2 (L) 03/30/2025    MCV 85 03/31/2025    MCV 89 03/30/2025    MCH 25.2 (L) 03/31/2025    MCH 24.2 (L) 03/30/2025    MCHC 29.6 (L) 03/31/2025    MCHC 27.2 (L) 03/30/2025    RDW 18.2 (H) 03/31/2025    RDW 19.2 (H) 03/30/2025     03/31/2025     03/30/2025       Cardiac Enzymes:    No " "results found for: \"TROPHS\"    Hepatic Function Panel:    Lab Results   Component Value Date    ALKPHOS 55 03/30/2025    ALT 9 03/30/2025    AST 17 03/30/2025    PROT 6.6 03/30/2025    BILITOT 0.3 03/30/2025         Diagnostic Studies:     CT abdomen pelvis w IV contrast    Result Date: 3/30/2025  Interpreted By:  Jose Tolbert, STUDY: CT ABDOMEN PELVIS W IV CONTRAST;  3/30/2025 6:18 pm   INDICATION: Signs/Symptoms:New onset anemia.   COMPARISON: None   ACCESSION NUMBER(S): [id number]   ORDERING CLINICIAN: ALEXANDRO LOPEZ   TECHNIQUE: Axial CT images of the abdomen and pelvis with coronal and sagittal reconstructed images obtained after intravenous administration of contrast.   FINDINGS: LOWER CHEST: Small bilateral pleural effusions. Interlobular septal thickening suggestive of mild pulmonary edema. Dense mitral annulus calcification. Mild cardiomegaly. Coronary artery calcifications. BONES: Posterior displacement at the sacrococcygeal junction (203/70). No other acute osseous abnormalities detected. ABDOMINAL WALL: Trace decubitus edema.   ABDOMEN:   LIVER: Left hepatic lobe cysts. BILE DUCTS: Unremarkable. GALLBLADDER: Cholelithiasis. PANCREAS:Unremarkable. SPLEEN: Unremarkable. ADRENALS: Unremarkable. KIDNEYS and URETERS: Right posterior interpolar cyst. No nephrolithiasis or hydronephrosis. VESSELS: Moderate aortoiliac atherosclerosis. LYMPH NODES: Unremarkable. RETROPERITONEUM/PERITONEUM: Unremarkable. BOWEL: Tiny hiatal hernia with irregular wall thickening present about the gastroesophageal junction (201/14). Colonic diverticulosis.   PELVIS:   REPRODUCTIVE ORGANS: No pelvic masses. BLADDER: Unremarkable.       1. No significant retroperitoneal or peritoneal hematomas. 2. Tiny hiatal hernia with irregular wall thickening present about the gastroesophageal junction. Findings may be sequelae of esophagitis although underlying ulcer or neoplastic lesion is not excluded. Recommend endoscopy for further " evaluation. 3. Mild cardiomegaly with small bilateral pleural effusions and mild pulmonary edema. 4. Posterior displacement of the sacrococcygeal junction, likely a chronic finding but recommend correlation with pain on exam. 5. Cholelithiasis.   MACRO: Critical Finding:  See findings. Notification was initiated on 3/30/2025 at 7:18 pm by  Jose Tolbert.  (**-YCF-**) Instructions:   Signed by: Jose Tolbetr 3/30/2025 7:18 PM Dictation workstation:   [id number]      Radiology:     CT abdomen pelvis w IV contrast   Final Result   1. No significant retroperitoneal or peritoneal hematomas.   2. Tiny hiatal hernia with irregular wall thickening present about   the gastroesophageal junction. Findings may be sequelae of   esophagitis although underlying ulcer or neoplastic lesion is not   excluded. Recommend endoscopy for further evaluation.   3. Mild cardiomegaly with small bilateral pleural effusions and mild   pulmonary edema.   4. Posterior displacement of the sacrococcygeal junction, likely a   chronic finding but recommend correlation with pain on exam.   5. Cholelithiasis.        MACRO:   Critical Finding:  See findings. Notification was initiated on   3/30/2025 at 7:18 pm by  Jose Tolbert.  (**-YCF-**) Instructions:        Signed by: Jose Tolbert 3/30/2025 7:18 PM   Dictation workstation:   [id number]          Assessment:     Problem List Items Addressed This Visit       * (Principal) Acute lower gastrointestinal hemorrhage - Primary     Other Visit Diagnoses       Blood loss anemia                Patient Active Problem List   Diagnosis    Allergic rhinitis    Basal cell carcinoma    Bradycardia    Chronic heart failure with preserved ejection fraction    Flat epithelial atypia (FEA) of breast    Heart disease    Rheumatic heart disease    History of mitral valve replacement with mechanical valve    Iron deficiency    Mammogram abnormal    Mitral valve disease    Mitral valve stenosis, rheumatic    Obesity,  Class II, BMI 35-39.9    Persistent atrial fibrillation (Multi)    Positive colorectal cancer screening using Cologuard test    Prediabetes    TIA (transient ischemic attack)    Tinnitus    Tricuspid valve insufficiency, non-rheumatic    Venous (peripheral) insufficiency    Acute lower gastrointestinal hemorrhage       Plan:     1.  Severe anemia.  Chronic anticoagulation.  At this point in time since the patient's hemoglobin has improved would not totally discontinue her warfarin anticoagulation and would not give vitamin K.  Would continue low-dose warfarin.  As she normally takes 3 mg daily would consider taking 1.5 mg daily unless the GI is clearly going to perform a procedure.  In that case warfarin can be held, again please do not give vitamin K, and when the patient's INR is less than 2 we will need to start either Lovenox or heparin weight-based anticoagulation.  She can go for approximately 2 to 3 days subtherapeutic and we will check her daily to make sure she has a good mechanical click.  After that the incidence of valve thrombosis does start to increase.  This is has to be weighed and offset against any signs of any acute bleeding.  The patient's history of some blood when wiping and touching her skin as well as no history of melena or bright red blood per rectum is not highly suggestive and the patient feels she has not had any acute GI bleeding.  Obviously that has yet to be determined.    2.  Mechanical mitral valve.  Goal INR of 2.5-3.5.  Would recommend keeping her at the lower end of that range of 2.5 with her being off anticoagulation.  Continue to monitor for heart failure symptoms and loss of her mechanical mitral valve click.  3.  History of longstanding persistent atrial fibrillation status post ablation and now on oral antiarrhythmic therapy.  If there is no contraindication would continue her Tikosyn to help maintain sinus rhythm.    Patient has had recent extensive cardiac workup at the  [facility] including cardiac MRI.  No specific cardiac testing is recommended or necessary at this time during this hospitalization.  Plans are for the patient to follow-up with her [facility] cardiologist postdischarge.    Thank you for the opportunity to participate in the care of your patient.  Please do not hesitate to call if you have any questions.    Electronically signed by Blanca Bunn MD, on 3/31/2025 at 9:09 AM

## 2025-03-31 NOTE — CARE PLAN
The patient's goals for the shift include      The clinical goals for the shift include See plan of care      Problem: Pain - Adult  Goal: Verbalizes/displays adequate comfort level or baseline comfort level  Outcome: Progressing     Problem: Safety - Adult  Goal: Free from fall injury  Outcome: Progressing     Problem: Discharge Planning  Goal: Discharge to home or other facility with appropriate resources  Outcome: Progressing     Problem: Chronic Conditions and Co-morbidities  Goal: Patient's chronic conditions and co-morbidity symptoms are monitored and maintained or improved  Outcome: Progressing     Problem: Nutrition  Goal: Nutrient intake appropriate for maintaining nutritional needs  Outcome: Progressing     Problem: Skin  Goal: Participates in plan/prevention/treatment measures  Outcome: Progressing  Goal: Prevent/manage excess moisture  Outcome: Progressing  Goal: Prevent/minimize sheer/friction injuries  Outcome: Progressing  Goal: Promote/optimize nutrition  Outcome: Progressing  Goal: Promote skin healing  Outcome: Progressing     EOS: Patient had an uneventful night. Patient vital signs stable. Patient safety maintained.

## 2025-03-31 NOTE — NURSING NOTE
0830 pt A&O x4, calm and cooperative. Pt sitting up to side of bed w/o complaint. Pt continues to be NPO. 0855 Dr Bunn at bedside.  1800 pt sts that she does not want to do any invasive procedure. Pt educated  on low HGB and causes. Pt sts understanding.

## 2025-03-31 NOTE — PROGRESS NOTES
Physical Therapy                       Therapy Communication Note    Patient Name: Sruthi Cardenas  MRN: [id number]  Today's Date: 3/31/2025     Discipline: Physical Therapy    Missed Visit Reason: PT order received, chart reviewed. Spoke with RN and pt. Pt has been ambulating in room independently. Pt feels that she is near her baseline level of function. No skilled PT needs at this time; will sign off.     Missed Time: Attempt

## 2025-03-31 NOTE — PROGRESS NOTES
Spiritual Care Visit  Spiritual Care Request    Reason for Visit:  Routine Visit: Introduction     Request Received From:       Focus of Care:  Visited With: Patient         Refer to :          Spiritual Care Assessment    Spiritual Assessment:                      Care Provided:  Intended Effects: Build relationship of care and support, Demonstrate caring and concern, Preserve dignity and respect, Establish rapport and connectedness  Methods: Offer spiritual/Sikh support  Interventions: Active listening, Ask guided questions, Assist someone with Advance Directives, Share words of hope and inspiration    Sense of Community and or Confucianism Affiliation:  None         Addressed Needs/Concerns and/or Denise Through:          Outcome:        Advance Directives:  Advance Directives  Advance Directives Reviewed Date: 03/31/25  Advance Directives Reviewed with: Patient  Comment: Left her with a blank copy and my card. Said she wants to name her sister Garland Steve, but is too tired to deal with it now.      Spiritual Care Annotation    Annotation:

## 2025-04-01 ENCOUNTER — PHARMACY VISIT (OUTPATIENT)
Dept: PHARMACY | Facility: CLINIC | Age: 75
End: 2025-04-01
Payer: COMMERCIAL

## 2025-04-01 VITALS
RESPIRATION RATE: 16 BRPM | TEMPERATURE: 97.2 F | BODY MASS INDEX: 27.78 KG/M2 | WEIGHT: 177 LBS | DIASTOLIC BLOOD PRESSURE: 48 MMHG | SYSTOLIC BLOOD PRESSURE: 121 MMHG | OXYGEN SATURATION: 94 % | HEIGHT: 67 IN | HEART RATE: 74 BPM

## 2025-04-01 LAB
ANION GAP SERPL CALC-SCNC: 13 MMOL/L (ref 10–20)
BASOPHILS # BLD AUTO: 0.05 X10*3/UL (ref 0–0.1)
BASOPHILS NFR BLD AUTO: 0.7 %
BUN SERPL-MCNC: 15 MG/DL (ref 6–23)
CALCIUM SERPL-MCNC: 9 MG/DL (ref 8.6–10.3)
CHLORIDE SERPL-SCNC: 105 MMOL/L (ref 98–107)
CO2 SERPL-SCNC: 22 MMOL/L (ref 21–32)
CREAT SERPL-MCNC: 0.93 MG/DL (ref 0.5–1.05)
EGFRCR SERPLBLD CKD-EPI 2021: 64 ML/MIN/1.73M*2
EOSINOPHIL # BLD AUTO: 0.25 X10*3/UL (ref 0–0.4)
EOSINOPHIL NFR BLD AUTO: 3.7 %
ERYTHROCYTE [DISTWIDTH] IN BLOOD BY AUTOMATED COUNT: 18.5 % (ref 11.5–14.5)
GLUCOSE SERPL-MCNC: 105 MG/DL (ref 74–99)
HCT VFR BLD AUTO: 29.6 % (ref 36–46)
HGB BLD-MCNC: 8.7 G/DL (ref 12–16)
IMM GRANULOCYTES # BLD AUTO: 0.03 X10*3/UL (ref 0–0.5)
IMM GRANULOCYTES NFR BLD AUTO: 0.4 % (ref 0–0.9)
INR PPP: 1.8 (ref 0.9–1.1)
LYMPHOCYTES # BLD AUTO: 0.98 X10*3/UL (ref 0.8–3)
LYMPHOCYTES NFR BLD AUTO: 14.3 %
MAGNESIUM SERPL-MCNC: 2.25 MG/DL (ref 1.6–2.4)
MCH RBC QN AUTO: 25.5 PG (ref 26–34)
MCHC RBC AUTO-ENTMCNC: 29.4 G/DL (ref 32–36)
MCV RBC AUTO: 87 FL (ref 80–100)
MONOCYTES # BLD AUTO: 0.7 X10*3/UL (ref 0.05–0.8)
MONOCYTES NFR BLD AUTO: 10.2 %
NEUTROPHILS # BLD AUTO: 4.82 X10*3/UL (ref 1.6–5.5)
NEUTROPHILS NFR BLD AUTO: 70.7 %
NRBC BLD-RTO: 0 /100 WBCS (ref 0–0)
PLATELET # BLD AUTO: 332 X10*3/UL (ref 150–450)
POTASSIUM SERPL-SCNC: 4.2 MMOL/L (ref 3.5–5.3)
PROTHROMBIN TIME: 19.7 SECONDS (ref 9.8–12.4)
RBC # BLD AUTO: 3.41 X10*6/UL (ref 4–5.2)
SODIUM SERPL-SCNC: 136 MMOL/L (ref 136–145)
WBC # BLD AUTO: 6.8 X10*3/UL (ref 4.4–11.3)

## 2025-04-01 PROCEDURE — 36415 COLL VENOUS BLD VENIPUNCTURE: CPT | Performed by: INTERNAL MEDICINE

## 2025-04-01 PROCEDURE — 2500000004 HC RX 250 GENERAL PHARMACY W/ HCPCS (ALT 636 FOR OP/ED): Performed by: INTERNAL MEDICINE

## 2025-04-01 PROCEDURE — 99239 HOSP IP/OBS DSCHRG MGMT >30: CPT | Performed by: INTERNAL MEDICINE

## 2025-04-01 PROCEDURE — 80048 BASIC METABOLIC PNL TOTAL CA: CPT | Performed by: INTERNAL MEDICINE

## 2025-04-01 PROCEDURE — RXMED WILLOW AMBULATORY MEDICATION CHARGE

## 2025-04-01 PROCEDURE — 85025 COMPLETE CBC W/AUTO DIFF WBC: CPT | Performed by: INTERNAL MEDICINE

## 2025-04-01 PROCEDURE — 2500000002 HC RX 250 W HCPCS SELF ADMINISTERED DRUGS (ALT 637 FOR MEDICARE OP, ALT 636 FOR OP/ED): Performed by: INTERNAL MEDICINE

## 2025-04-01 PROCEDURE — 99232 SBSQ HOSP IP/OBS MODERATE 35: CPT | Performed by: INTERNAL MEDICINE

## 2025-04-01 PROCEDURE — 83735 ASSAY OF MAGNESIUM: CPT | Performed by: INTERNAL MEDICINE

## 2025-04-01 PROCEDURE — 2500000001 HC RX 250 WO HCPCS SELF ADMINISTERED DRUGS (ALT 637 FOR MEDICARE OP): Performed by: INTERNAL MEDICINE

## 2025-04-01 PROCEDURE — 85610 PROTHROMBIN TIME: CPT | Performed by: INTERNAL MEDICINE

## 2025-04-01 RX ORDER — PANTOPRAZOLE SODIUM 40 MG/1
40 TABLET, DELAYED RELEASE ORAL
Qty: 30 TABLET | Refills: 0 | Status: SHIPPED | OUTPATIENT
Start: 2025-04-01 | End: 2025-04-01

## 2025-04-01 RX ORDER — ENOXAPARIN SODIUM 100 MG/ML
1 INJECTION SUBCUTANEOUS EVERY 12 HOURS
Qty: 2 EACH | Refills: 0 | Status: SHIPPED | OUTPATIENT
Start: 2025-04-01 | End: 2025-04-02

## 2025-04-01 RX ORDER — ENOXAPARIN SODIUM 100 MG/ML
1 INJECTION SUBCUTANEOUS EVERY 12 HOURS
Status: DISCONTINUED | OUTPATIENT
Start: 2025-04-01 | End: 2025-04-01 | Stop reason: HOSPADM

## 2025-04-01 RX ORDER — PANTOPRAZOLE SODIUM 40 MG/1
40 TABLET, DELAYED RELEASE ORAL
Qty: 30 TABLET | Refills: 0 | Status: SHIPPED | OUTPATIENT
Start: 2025-04-01 | End: 2025-05-01

## 2025-04-01 RX ORDER — FAMOTIDINE 20 MG/1
20 TABLET, FILM COATED ORAL NIGHTLY
Qty: 30 TABLET | Refills: 0 | Status: SHIPPED | OUTPATIENT
Start: 2025-04-01 | End: 2025-04-01

## 2025-04-01 RX ORDER — ENOXAPARIN SODIUM 100 MG/ML
1 INJECTION SUBCUTANEOUS EVERY 12 HOURS
Qty: 2 EACH | Refills: 0 | Status: SHIPPED | OUTPATIENT
Start: 2025-04-01 | End: 2025-04-01

## 2025-04-01 RX ORDER — FAMOTIDINE 20 MG/1
20 TABLET, FILM COATED ORAL NIGHTLY
Qty: 30 TABLET | Refills: 0 | Status: SHIPPED | OUTPATIENT
Start: 2025-04-01 | End: 2025-05-01

## 2025-04-01 RX ADMIN — Medication 400 MG: at 09:33

## 2025-04-01 RX ADMIN — CYANOCOBALAMIN TAB 500 MCG 500 MCG: 500 TAB at 09:33

## 2025-04-01 RX ADMIN — SODIUM CHLORIDE 300 MG: 9 INJECTION, SOLUTION INTRAVENOUS at 06:09

## 2025-04-01 RX ADMIN — Medication 1 TABLET: at 09:33

## 2025-04-01 RX ADMIN — DOFETILIDE 125 MCG: 0.12 CAPSULE ORAL at 09:34

## 2025-04-01 RX ADMIN — CETIRIZINE HYDROCHLORIDE 10 MG: 10 TABLET, FILM COATED ORAL at 09:33

## 2025-04-01 RX ADMIN — PANTOPRAZOLE SODIUM 40 MG: 40 TABLET, DELAYED RELEASE ORAL at 06:08

## 2025-04-01 RX ADMIN — ENOXAPARIN SODIUM 80 MG: 80 INJECTION SUBCUTANEOUS at 10:48

## 2025-04-01 RX ADMIN — AZELASTINE HYDROCHLORIDE 1 SPRAY: 137 SPRAY, METERED NASAL at 09:33

## 2025-04-01 RX ADMIN — SPIRONOLACTONE 12.5 MG: 25 TABLET ORAL at 06:07

## 2025-04-01 ASSESSMENT — COGNITIVE AND FUNCTIONAL STATUS - GENERAL
DAILY ACTIVITIY SCORE: 24
MOBILITY SCORE: 24

## 2025-04-01 ASSESSMENT — PAIN - FUNCTIONAL ASSESSMENT: PAIN_FUNCTIONAL_ASSESSMENT: 0-10

## 2025-04-01 ASSESSMENT — PAIN SCALES - GENERAL: PAINLEVEL_OUTOF10: 0 - NO PAIN

## 2025-04-01 NOTE — DISCHARGE SUMMARY
Discharge Diagnosis  Acute lower gastrointestinal hemorrhage    Issues Requiring Follow-Up  Follow-up with primary care provider    Discharge Meds     Medication List      START taking these medications     enoxaparin 80 mg/0.8 mL syringe; Commonly known as: Lovenox; Inject 0.8   mL (80 mg) under the skin every 12 hours for 1 day.   famotidine 20 mg tablet; Commonly known as: Pepcid; Take 1 tablet (20   mg) by mouth once daily at bedtime.   pantoprazole 40 mg EC tablet; Commonly known as: ProtoNix; Take 1 tablet   (40 mg) by mouth once daily in the morning. Take before meals. Do not   crush, chew, or split.     CONTINUE taking these medications     azelastine 137 mcg (0.1 %) nasal spray; Commonly known as: Astelin;   Administer 1 spray into each nostril 2 times a day. Use in each nostril as   directed   biotin 2,500 mcg capsule   Calcium 600 + D(3) 600 mg-10 mcg (400 unit) tablet; Generic drug:   calcium carbonate-vitamin D3   cetirizine 10 mg tablet; Commonly known as: ZyrTEC; Take 1 tablet (10   mg) by mouth once daily.   cranberry 450 mg tablet; Generic drug: cranberry fruit   cyanocobalamin 500 mcg tablet; Commonly known as: Vitamin B-12   dofetilide 125 mcg capsule; Commonly known as: Tikosyn   magnesium oxide 400 mg tablet; Commonly known as: Mag-Ox   multivitamin tablet   Osteo Bi-Flex 250-200 mg tablet; Generic drug: glucosamine-chondroitin   Slow Release Iron 160 mg (50 mg iron) ER tablet; Generic drug: ferrous   sulfate, dried   spironolactone 25 mg tablet; Commonly known as: Aldactone   * warfarin 3 mg tablet; Commonly known as: Coumadin   * warfarin 3 mg tablet; Commonly known as: Coumadin  * This list has 2 medication(s) that are the same as other medications   prescribed for you. Read the directions carefully, and ask your doctor or   other care provider to review them with you.       Test Results Pending At Discharge  Pending Labs       No current pending labs.            Hospital Course  Sruthi CHOI  Ronald is a 74-year-old female with past medical history of atrial fibrillation/flutter, mitral valve replacement on Coumadin with goal of 2.5-3.5, iron deficiency anemia, hyperlipidemia, prediabetes, history of TIA presented to emergency department for abnormal labs and fatigue.  Patient was found to have hemoglobin of 5.6 on outpatient labs.  Patient was admitted and seen and evaluated by cardiology and GI during the hospital stay.  She was given 2 units of packed RBC after which her hemoglobin improved to 7.9.  Coumadin was held for 1 night and no vitamin K was given.  Patient is feeling better and was found to have significant iron deficiency anemia and was given IV Venofer for 2 doses.  Her hemoglobin has improved to 8.7 she is not having any signs and symptoms of bleeding.  She will need outpatient colonoscopy once a follow-up with hematology oncology through [facility].  Her INR did dip down to 1.8 today.  She will be discharged on 1 day of Lovenox.  She already received Coumadin 5 mg yesterday in anticipation that her INR will be greater than 2.  She follows up with [facility] Coumadin clinic and will follow-up with them.  Currently she is in stable condition for discharge and will follow-up with PCP, GI and cardiology as outpatient.  She will get a referral for new hematology oncology through her PCP.    32 minutes spent in discharge timing    Pertinent Physical Exam At Time of Discharge  General: Not in acute distress, alert  HEENT: PERRLA, head intact and normocephalic  Neck: Normal to inspection  Lungs: Clear to auscultation, work of breathing within normal limit  Cardiac: Mechanical click noted  Abdomen: Soft nontender, positive bowel sounds  : Exam deferred  Skin: Intact  Hematology: No petechia or excessive ecchymosis  Musculoskeletal: Without significant trauma  Neurological: Alert awake oriented, no focal deficit, cranial nerves grossly intact  Psych: No suicidal ideation or  homicidal ideation    Outpatient Follow-Up  Future Appointments   Date Time Provider Department Center   9/29/2025 11:30 AM [facility] [place]         Alonso Wharton MD

## 2025-04-01 NOTE — CARE PLAN
The patient's goals for the shift include      The clinical goals for the shift include pt will remain hds this shift    Over the shift, the patient did make progress toward the following goals.

## 2025-04-01 NOTE — NURSING NOTE
"0930 pt up to chair, sts that she is \"going home no matter what\". Pt denies dizziness and weakness. Pt sts that she exercised lastnight. Waiting on morning labs.   1200 pt to be discharged with lovenox , pt sts understanding on administration d/t having to do them on herself for 2 weeks post cardiac surgery. Awaiting meds to beds.   "

## 2025-04-01 NOTE — HOSPITAL COURSE
Sruthi Cardenas is a 74-year-old female with past medical history of atrial fibrillation/flutter, mitral valve replacement on Coumadin with goal of 2.5-3.5, iron deficiency anemia, hyperlipidemia, prediabetes, history of TIA presented to emergency department for abnormal labs and fatigue.  Patient was found to have hemoglobin of 5.6 on outpatient labs.  Patient was admitted and seen and evaluated by cardiology and GI during the hospital stay.  She was given 2 units of packed RBC after which her hemoglobin improved to 7.9.  Coumadin was held for 1 night and no vitamin K was given.  Patient is feeling better and was found to have significant iron deficiency anemia and was given IV Venofer for 2 doses.  Her hemoglobin has improved to 8.7 she is not having any signs and symptoms of bleeding.  She will need outpatient colonoscopy once a follow-up with hematology oncology through [facility].  Her INR did dip down to 1.8 today.  She will be discharged on 1 day of Lovenox.  She already received Coumadin 5 mg yesterday in anticipation that her INR will be greater than 2.  She follows up with [facility] Coumadin clinic and will follow-up with them.  Currently she is in stable condition for discharge and will follow-up with PCP, GI and cardiology as outpatient.  She will get a referral for new hematology oncology through her PCP.    32 minutes spent in discharge timing

## 2025-04-01 NOTE — PROGRESS NOTES
Cardiology Progress Note           Rounding Cardiologist:  Dr. Blanca Bunn  Primary Cardiologist:  [facility]     Date:  4/1/2025  Patient:  Sruthi Cardenas  YOB: 1950  MRN:  [id number]   Admit Date:  3/30/2025      SUBJECTIVE    Sruthi Cardenas was seen and examined today at bedside. Has had no further bloody stools. No chest pain or discomfort, no shortness of breath. Given warfarin last pm (5 mg) and scheduled for warfarin later today. States she does not want a colonoscopy unless there are no alternatives.     Review of Systems   No new complaints.   VITALS     Vitals:    03/31/25 2000 04/01/25 0000 04/01/25 0400 04/01/25 0800   BP: 151/70 153/85 150/64 150/70   BP Location: Left arm Left arm Left arm    Patient Position: Lying Lying Lying    Pulse: 67 67 65 62   Resp: 16 17 16 16   Temp: 36.6 °C (97.9 °F) 36.5 °C (97.7 °F) 36.3 °C (97.3 °F) 36.5 °C (97.7 °F)   TempSrc: Temporal Temporal Temporal Temporal   SpO2: 98% 94% 93% 96%   Weight:       Height:           Intake/Output Summary (Last 24 hours) at 4/1/2025 0957  Last data filed at 3/31/2025 1521  Gross per 24 hour   Intake 730 ml   Output --   Net 730 ml       Vitals:    03/30/25 1310   Weight: 80.3 kg (177 lb)       CURRENT MEDICATIONS    azelastine, 1 spray, Each Nostril, BID  [Held by provider] biotin, 1 capsule, oral, Daily  calcium carbonate-vitamin D3, 1 tablet, oral, Daily  cetirizine, 10 mg, oral, Daily  cyanocobalamin, 500 mcg, oral, Daily  dofetilide, 125 mcg, oral, q12h  famotidine, 20 mg, oral, Nightly  iron sucrose, 300 mg, intravenous, Daily  magnesium oxide, 400 mg, oral, Daily  pantoprazole, 40 mg, oral, BID AC  spironolactone, 12.5 mg, oral, Daily  warfarin, 3 mg, oral, Daily         Current Outpatient Medications   Medication Instructions    azelastine (Astelin) 137 mcg (0.1 %) nasal spray 1 spray, Each Nostril, 2 times daily, Use in each nostril as directed    biotin 2,500 mcg capsule 1 capsule, oral,  "Daily    calcium carbonate-vitamin D3 (Calcium 600 + D,3,) 600 mg-10 mcg (400 unit) tablet 1 tablet, oral, Daily    cetirizine (ZYRTEC) 10 mg, oral, Daily    cranberry fruit (cranberry) 450 mg tablet 1 capsule, oral, Daily    cyanocobalamin (VITAMIN B-12) 500 mcg, Daily    dofetilide (TIKOSYN) 125 mcg, oral, Every 12 hours    ferrous sulfate, dried (Slow Release Iron) 160 mg (50 mg iron) ER tablet 1 tablet, oral, Daily    glucosamine-chondroitin (Osteo Bi-Flex) 250-200 mg tablet 1 tablet, oral, Daily    magnesium oxide (MAG-OX) 400 mg, oral, Daily    multivitamin tablet 1 tablet, oral, Daily RT    spironolactone (ALDACTONE) 12.5 mg, oral, Daily RT    warfarin (Coumadin) 3 mg tablet Take 1 tablet (3 mg) by mouth 5 times a week. QHS    warfarin (COUMADIN) 1.5 mg, oral, 2 times weekly, QHS        PHYSICAL EXAMINATION   GENERAL:  Well developed, well nourished, in no acute distress.  CHEST:  Symmetric and nontender.  NECK: no JVD, no bruit.  LUNGS:  Clear to auscultation bilaterally, normal respiratory effort.  HEART:  PMI nondisplaced. RR, mechanical S1 click, normal S2, no S3.   ABDOMEN: Soft, NT, ND without palpable organomegaly, normoactive bowel sounds.   EXTREMITIES:  Warm with good color, no clubbing or cyanosis.  There is no edema noted.  PERIPHERAL VASCULAR:  Pulses present and equally palpable; 2+ throughout.  NEURO/PSYCH:  Alert and oriented times three with approppriate behavior and responses.  INTEGUMENT: No rashes    LAB DATA     CBC:   Results from last 7 days   Lab Units 04/01/25  0912   WBC AUTO x10*3/uL 6.8   RBC AUTO x10*6/uL 3.41*   HEMOGLOBIN g/dL 8.7*   HEMATOCRIT % 29.6*   PLATELETS AUTO x10*3/uL 332        CMP:    Results from last 7 days   Lab Units 04/01/25  0912   SODIUM mmol/L 136   POTASSIUM mmol/L 4.2   CHLORIDE mmol/L 105   CO2 mmol/L 22   BUN mg/dL 15   CREATININE mg/dL 0.93   GLUCOSE mg/dL 105*   CALCIUM mg/dL 9.0       Cardiac Enzymes:  No results found for: \"TROPHS\"    Magnesium:  " "  Lab Results   Component Value Date    MG 2.25 04/01/2025       Lipid Profile:  No results found for: \"CHLPL\", \"TRIG\", \"HDL\", \"LDLCALC\", \"LDLDIRECT\"    TSH:    Lab Results   Component Value Date    TSH 1.18 03/30/2025       BNP: No results found for: \"BNP\"     PT/INR:    Lab Results   Component Value Date    PROTIME 19.7 (H) 04/01/2025    INR 1.8 (H) 04/01/2025       HgBA1c:    Lab Results   Component Value Date    HGBA1C 5.3 11/04/2024       CBC:  Lab Results   Component Value Date    WBC 6.8 04/01/2025    WBC 7.4 03/31/2025    WBC 4.9 03/30/2025    RBC 3.41 (L) 04/01/2025    RBC 3.14 (L) 03/31/2025    RBC 2.27 (L) 03/30/2025    HGB 8.7 (L) 04/01/2025    HGB 7.9 (L) 03/31/2025    HGB 5.5 (LL) 03/30/2025    HCT 29.6 (L) 04/01/2025    HCT 26.7 (L) 03/31/2025    HCT 20.2 (L) 03/30/2025    MCV 87 04/01/2025    MCV 85 03/31/2025    MCV 89 03/30/2025    MCH 25.5 (L) 04/01/2025    MCH 25.2 (L) 03/31/2025    MCH 24.2 (L) 03/30/2025    MCHC 29.4 (L) 04/01/2025    MCHC 29.6 (L) 03/31/2025    MCHC 27.2 (L) 03/30/2025    RDW 18.5 (H) 04/01/2025    RDW 18.2 (H) 03/31/2025    RDW 19.2 (H) 03/30/2025     04/01/2025     03/31/2025     03/30/2025       BMP:  Lab Results   Component Value Date     04/01/2025     03/31/2025     03/30/2025    K 4.2 04/01/2025    K 4.2 03/31/2025    K 4.1 03/30/2025     04/01/2025     03/31/2025     03/30/2025    CO2 22 04/01/2025    CO2 24 03/31/2025    CO2 25 03/30/2025    BUN 15 04/01/2025    BUN 18 03/31/2025    BUN 26 (H) 03/30/2025    CREATININE 0.93 04/01/2025    CREATININE 0.84 03/31/2025    CREATININE 0.90 03/30/2025       Hepatic Function Panel:    Lab Results   Component Value Date    ALKPHOS 55 03/30/2025    ALT 9 03/30/2025    AST 17 03/30/2025    PROT 6.6 03/30/2025    BILITOT 0.3 03/30/2025     Labs reviewed - todays INR 1.8, yesterday 2.1    DIAGNOSTIC TESTING RESULTS     CT abdomen pelvis w IV contrast    Result Date: " 3/30/2025  Interpreted By:  Jose Tolbert, STUDY: CT ABDOMEN PELVIS W IV CONTRAST;  3/30/2025 6:18 pm   INDICATION: Signs/Symptoms:New onset anemia.   COMPARISON: None   ACCESSION NUMBER(S): [id number]   ORDERING CLINICIAN: ALEXANDRO LOPEZ   TECHNIQUE: Axial CT images of the abdomen and pelvis with coronal and sagittal reconstructed images obtained after intravenous administration of contrast.   FINDINGS: LOWER CHEST: Small bilateral pleural effusions. Interlobular septal thickening suggestive of mild pulmonary edema. Dense mitral annulus calcification. Mild cardiomegaly. Coronary artery calcifications. BONES: Posterior displacement at the sacrococcygeal junction (203/70). No other acute osseous abnormalities detected. ABDOMINAL WALL: Trace decubitus edema.   ABDOMEN:   LIVER: Left hepatic lobe cysts. BILE DUCTS: Unremarkable. GALLBLADDER: Cholelithiasis. PANCREAS:Unremarkable. SPLEEN: Unremarkable. ADRENALS: Unremarkable. KIDNEYS and URETERS: Right posterior interpolar cyst. No nephrolithiasis or hydronephrosis. VESSELS: Moderate aortoiliac atherosclerosis. LYMPH NODES: Unremarkable. RETROPERITONEUM/PERITONEUM: Unremarkable. BOWEL: Tiny hiatal hernia with irregular wall thickening present about the gastroesophageal junction (201/14). Colonic diverticulosis.   PELVIS:   REPRODUCTIVE ORGANS: No pelvic masses. BLADDER: Unremarkable.       1. No significant retroperitoneal or peritoneal hematomas. 2. Tiny hiatal hernia with irregular wall thickening present about the gastroesophageal junction. Findings may be sequelae of esophagitis although underlying ulcer or neoplastic lesion is not excluded. Recommend endoscopy for further evaluation. 3. Mild cardiomegaly with small bilateral pleural effusions and mild pulmonary edema. 4. Posterior displacement of the sacrococcygeal junction, likely a chronic finding but recommend correlation with pain on exam. 5. Cholelithiasis.   MACRO: Critical Finding:  See findings.  Notification was initiated on 3/30/2025 at 7:18 pm by  Jose Tolbert.  (**-YCF-**) Instructions:   Signed by: Jose Tolbert 3/30/2025 7:18 PM Dictation workstation:   [id number]         RADIOLOGY     CT abdomen pelvis w IV contrast   Final Result   1. No significant retroperitoneal or peritoneal hematomas.   2. Tiny hiatal hernia with irregular wall thickening present about   the gastroesophageal junction. Findings may be sequelae of   esophagitis although underlying ulcer or neoplastic lesion is not   excluded. Recommend endoscopy for further evaluation.   3. Mild cardiomegaly with small bilateral pleural effusions and mild   pulmonary edema.   4. Posterior displacement of the sacrococcygeal junction, likely a   chronic finding but recommend correlation with pain on exam.   5. Cholelithiasis.        MACRO:   Critical Finding:  See findings. Notification was initiated on   3/30/2025 at 7:18 pm by  Jose Tolbert.  (**-YCF-**) Instructions:        Signed by: Jose Tolbert 3/30/2025 7:18 PM   Dictation workstation:   [id number]            ASSESSMENT     Problem List Items Addressed This Visit       * (Principal) Acute lower gastrointestinal hemorrhage - Primary     Other Visit Diagnoses       Blood loss anemia                Patient Active Problem List   Diagnosis    Allergic rhinitis    Basal cell carcinoma    Bradycardia    Chronic heart failure with preserved ejection fraction    Flat epithelial atypia (FEA) of breast    Heart disease    Rheumatic heart disease    H/O mitral valve replacement with mechanical valve    Iron deficiency    Mammogram abnormal    Mitral valve disease    Mitral valve stenosis, rheumatic    Obesity, Class II, BMI 35-39.9    Atrial fibrillation (Multi)    Positive colorectal cancer screening using Cologuard test    Prediabetes    TIA (transient ischemic attack)    Tinnitus    Tricuspid valve insufficiency, non-rheumatic    Venous (peripheral) insufficiency    Acute lower gastrointestinal  hemorrhage    Chronic anticoagulation    Severe anemia    High risk medication use    Hypertension       PLAN     1.  Lower GI bleeding.  Appears to have stopped.  Warfarin was restarted yesterday evening.  Would continue.  Discussed with the patient staying at the lower end of her therapeutic range from 2.5-3 rather than 3-3.5.  Explained to the patient that if she does need a colonoscopy 8 can be done with bridging she would stop her anticoagulation prior to would be given Lovenox until she was therapeutic post she does not want to pursue that at this time  2.  Severe anemia.  Improved received iron in addition to transfusion.  Per primary service.  3.  Mechanical mitral valve replacement.  Patient has a good mechanical S1 click.  Warfarin has been restarted.  She states she has home PT/INR monitoring.  I will consider having her take 5 mg of warfarin today, if she received oral or IV vitamin K then I would give her 5 mg today and tomorrow.  I do not see any order for vitamin K in the current chart and the patient is unaware of having received it.  If she did not receive oral vitamin K then I would give her 5 mg today and then start her usual dose 3 mg 5 days a week and 1.5 mg 2 days a week on Wednesday.  She can monitor her INR's and if not back to therapeutic in 2 days can contact her cardiologist for Lovenox injections.  4.  Paroxysmal atrial fibrillation on oral Tikosyn therapy.  Patient remains in sinus rhythm and taking her medication.    Can be discharged home from a cardiology standpoint.  Please call if any further problems or questions.  Patient can follow-up with her [facility] cardiologist as scheduled.    Please do not hesitate to call with questions.  Electronically signed by Blanca Bunn MD, on 4/1/2025 at 9:57 AM

## 2025-04-02 ENCOUNTER — PATIENT OUTREACH (OUTPATIENT)
Dept: PRIMARY CARE | Facility: CLINIC | Age: 75
End: 2025-04-02
Payer: MEDICARE

## 2025-04-02 ENCOUNTER — TELEPHONE (OUTPATIENT)
Dept: PRIMARY CARE | Facility: CLINIC | Age: 75
End: 2025-04-02
Payer: MEDICARE

## 2025-04-02 DIAGNOSIS — I48.19 PERSISTENT ATRIAL FIBRILLATION (MULTI): Primary | ICD-10-CM

## 2025-04-02 NOTE — PROGRESS NOTES
Discharge Facility:[facility]  Discharge Diagnosis:Acute lower gastrointestinal hemorrhage  Admission Date:3/30/25  Discharge Date: 4/1/25    PCP Appointment Date:4/8/25  Specialist Appointment Date: TBD  Hospital Encounter and Summary Linked: Yes  See discharge assessment below for further details    ED to Hosp-Admission (Discharged) with Alonso Wharton MD; Jon Florez DO (03/30/2025)   Discharge Summary by Alonso Wharton MD (04/01/2025 11:48)     Wrap Up  Wrap Up Additional Comments: Patient is doing well today. She has her discharge medications. She has a follow up appointment scheduled with her pcp on 4/8/25. No questions or concerns at this time. (4/2/2025 10:16 AM)    Medications  Medications reviewed with patient/caregiver?: Yes (4/2/2025 10:16 AM)  Is the patient having any side effects they believe may be caused by any medication additions or changes?: No (4/2/2025 10:16 AM)  Does the patient have all medications ordered at discharge?: Yes (4/2/2025 10:16 AM)  Care Management Interventions: Provided patient education (4/2/2025 10:16 AM)  Prescription Comments: Lovenox, Pepcid, Protonix (4/2/2025 10:16 AM)  Is the patient taking all medications as directed (includes completed medication regime)?: Yes (4/2/2025 10:16 AM)  Care Management Interventions: Provided patient education (4/2/2025 10:16 AM)  Medication Comments: Patient received her medications through [facility] to [facility]. (4/2/2025 10:16 AM)    Appointments  Does the patient have a primary care provider?: Yes (4/2/2025 10:16 AM)  Care Management Interventions: Verified appointment date/time/provider (4/2/2025 10:16 AM)  Has the patient kept scheduled appointments due by today?: Yes (4/2/2025 10:16 AM)  Care Management Interventions: Advised patient to keep appointment; Educated on importance of keeping appointment (4/2/2025 10:16 AM)    Self Management  Has home health visited the patient within 72 hours of discharge?: Not applicable (4/2/2025 10:16 AM)  What  Durable Medical Equipment (DME) was ordered?: N/A (4/2/2025 10:16 AM)    Patient Teaching  Does the patient have access to their discharge instructions?: Yes (4/2/2025 10:16 AM)  Care Management Interventions: Reviewed instructions with patient (4/2/2025 10:16 AM)  What is the patient's perception of their health status since discharge?: Improving (4/2/2025 10:16 AM)  Is the patient/caregiver able to teach back the hierarchy of who to call/visit for symptoms/problems? PCP, Specialist, Home Health nurse, Urgent Care, ED, 911: Yes (4/2/2025 10:16 AM)

## 2025-04-02 NOTE — TELEPHONE ENCOUNTER
Pt called she was told that she needs a inr checked before she can take her lovenox shot this week. She is requesting a order

## 2025-04-03 LAB
BLOOD EXPIRATION DATE: NORMAL
DISPENSE STATUS: NORMAL
INR PPP: 1.7
PRODUCT BLOOD TYPE: 5100
PRODUCT CODE: NORMAL
PROTHROMBIN TIME: 17.2 SEC (ref 9–11.5)
UNIT ABO: NORMAL
UNIT NUMBER: NORMAL
UNIT RH: NORMAL
UNIT VOLUME: 350
UNIT VOLUME: 500
XM INTEP: NORMAL

## 2025-04-07 ENCOUNTER — APPOINTMENT (OUTPATIENT)
Dept: PRIMARY CARE | Facility: CLINIC | Age: 75
End: 2025-04-07
Payer: MEDICARE

## 2025-04-07 VITALS
DIASTOLIC BLOOD PRESSURE: 68 MMHG | SYSTOLIC BLOOD PRESSURE: 118 MMHG | HEART RATE: 77 BPM | HEIGHT: 67 IN | BODY MASS INDEX: 28.25 KG/M2 | WEIGHT: 180 LBS

## 2025-04-07 DIAGNOSIS — K92.2 GASTROINTESTINAL HEMORRHAGE, UNSPECIFIED GASTROINTESTINAL HEMORRHAGE TYPE: ICD-10-CM

## 2025-04-07 DIAGNOSIS — D50.0 ANEMIA DUE TO BLOOD LOSS: Primary | ICD-10-CM

## 2025-04-07 PROBLEM — E66.812 OBESITY, CLASS II, BMI 35-39.9: Status: RESOLVED | Noted: 2023-09-20 | Resolved: 2025-04-07

## 2025-04-07 PROCEDURE — 99214 OFFICE O/P EST MOD 30 MIN: CPT | Performed by: FAMILY MEDICINE

## 2025-04-07 PROCEDURE — 3074F SYST BP LT 130 MM HG: CPT | Performed by: FAMILY MEDICINE

## 2025-04-07 PROCEDURE — 3078F DIAST BP <80 MM HG: CPT | Performed by: FAMILY MEDICINE

## 2025-04-07 PROCEDURE — 1036F TOBACCO NON-USER: CPT | Performed by: FAMILY MEDICINE

## 2025-04-07 PROCEDURE — 1159F MED LIST DOCD IN RCRD: CPT | Performed by: FAMILY MEDICINE

## 2025-04-07 PROCEDURE — 1111F DSCHRG MED/CURRENT MED MERGE: CPT | Performed by: FAMILY MEDICINE

## 2025-04-07 PROCEDURE — 1124F ACP DISCUSS-NO DSCNMKR DOCD: CPT | Performed by: FAMILY MEDICINE

## 2025-04-07 RX ORDER — CEPHALEXIN 500 MG/1
500 CAPSULE ORAL 2 TIMES DAILY
COMMUNITY
Start: 2025-04-06

## 2025-04-07 ASSESSMENT — ENCOUNTER SYMPTOMS
HEADACHES: 0
SHORTNESS OF BREATH: 0
FEVER: 0
DIZZINESS: 0
FATIGUE: 0
ACTIVITY CHANGE: 0

## 2025-04-07 ASSESSMENT — PATIENT HEALTH QUESTIONNAIRE - PHQ9
1. LITTLE INTEREST OR PLEASURE IN DOING THINGS: NOT AT ALL
SUM OF ALL RESPONSES TO PHQ9 QUESTIONS 1 AND 2: 0
2. FEELING DOWN, DEPRESSED OR HOPELESS: NOT AT ALL

## 2025-04-07 NOTE — ASSESSMENT & PLAN NOTE
stable   - recheck cbc and iron levels   - f/u with GI to help manage her symptoms   - referral to hematology for evaluation given history of blood loss

## 2025-04-07 NOTE — PROGRESS NOTES
"Subjective   Patient ID: Sruthi Cardenas is a 74 y.o. female who presents for Hospital Follow-up ([facility] tcm acute lower gastro hemorrhage ).    Hospital follow up   - patient was found incidentally to found a hemoglobin of 5.6 on lab findings for weakness  - she was admitted to the hospital on 3/30/25 and was given two units of PRBC to help improve her hemoglobin levels   - was recommended to have GI series but patient declined at the time   - she did not have any scopes done at the time of hospitalization  - has been feeling a little better since discharge from the hospital   - has been having some trouble with sleeping, reports she has been up and down   - has not had any blood in the stool or dark tarry colored stools          Review of Systems   Constitutional:  Negative for activity change, fatigue and fever.   Respiratory:  Negative for shortness of breath.    Cardiovascular:  Negative for chest pain.   Neurological:  Negative for dizziness and headaches.       Objective   /68   Pulse 77   Ht 1.702 m (5' 7\")   Wt 81.6 kg (180 lb)   BMI 28.19 kg/m²     Physical Exam  Constitutional:       Appearance: Normal appearance.   Cardiovascular:      Rate and Rhythm: Normal rate and regular rhythm.   Neurological:      Mental Status: She is alert.   Psychiatric:         Mood and Affect: Mood normal.         Behavior: Behavior normal.         Assessment/Plan   Problem List Items Addressed This Visit             ICD-10-CM    Gastrointestinal hemorrhage K92.2     stable   - recheck cbc and iron levels   - f/u with GI to help manage her symptoms   - referral to hematology for evaluation given history of blood loss          Relevant Orders    Protime-INR    CBC and Auto Differential    Anemia due to blood loss - Primary D50.0     stable   - recheck cbc and iron levels   - f/u with GI to help manage her symptoms   - referral to hematology for evaluation given history of blood loss          Relevant Orders    " Referral To Hematology and Oncology    CBC and Auto Differential    Iron and TIBC    Referral to Gastroenterology    CBC and Auto Differential

## 2025-04-08 ENCOUNTER — TELEPHONE (OUTPATIENT)
Dept: PRIMARY CARE | Facility: CLINIC | Age: 75
End: 2025-04-08

## 2025-04-08 ENCOUNTER — APPOINTMENT (OUTPATIENT)
Dept: PRIMARY CARE | Facility: CLINIC | Age: 75
End: 2025-04-08
Payer: MEDICARE

## 2025-04-08 LAB
BASOPHILS # BLD AUTO: 49 CELLS/UL (ref 0–200)
BASOPHILS NFR BLD AUTO: 0.8 %
EOSINOPHIL # BLD AUTO: 232 CELLS/UL (ref 15–500)
EOSINOPHIL NFR BLD AUTO: 3.8 %
ERYTHROCYTE [DISTWIDTH] IN BLOOD BY AUTOMATED COUNT: 17.2 % (ref 11–15)
HCT VFR BLD AUTO: 30.7 % (ref 35–45)
HGB BLD-MCNC: 8.8 G/DL (ref 11.7–15.5)
INR PPP: 2.9
IRON SATN MFR SERPL: 10 % (CALC) (ref 16–45)
IRON SERPL-MCNC: 30 MCG/DL (ref 45–160)
LYMPHOCYTES # BLD AUTO: 1263 CELLS/UL (ref 850–3900)
LYMPHOCYTES NFR BLD AUTO: 20.7 %
MCH RBC QN AUTO: 25.8 PG (ref 27–33)
MCHC RBC AUTO-ENTMCNC: 28.7 G/DL (ref 32–36)
MCV RBC AUTO: 90 FL (ref 80–100)
MONOCYTES # BLD AUTO: 573 CELLS/UL (ref 200–950)
MONOCYTES NFR BLD AUTO: 9.4 %
NEUTROPHILS # BLD AUTO: 3983 CELLS/UL (ref 1500–7800)
NEUTROPHILS NFR BLD AUTO: 65.3 %
PLATELET # BLD AUTO: 319 THOUSAND/UL (ref 140–400)
PMV BLD REES-ECKER: 10.7 FL (ref 7.5–12.5)
PROTHROMBIN TIME: 28.3 SEC (ref 9–11.5)
RBC # BLD AUTO: 3.41 MILLION/UL (ref 3.8–5.1)
SERVICE CMNT-IMP: ABNORMAL
TIBC SERPL-MCNC: 305 MCG/DL (CALC) (ref 250–450)
WBC # BLD AUTO: 6.1 THOUSAND/UL (ref 3.8–10.8)

## 2025-04-08 NOTE — TELEPHONE ENCOUNTER
----- Message from Jose Be sent at 4/8/2025  8:13 AM EDT -----  INR back at goal, iron levels remain low

## 2025-04-08 NOTE — TELEPHONE ENCOUNTER
----- Message from Jose Be sent at 4/8/2025  1:04 PM EDT -----  Labs appear stable, hemoglobin stable.

## 2025-04-11 ENCOUNTER — PATIENT OUTREACH (OUTPATIENT)
Dept: PRIMARY CARE | Facility: CLINIC | Age: 75
End: 2025-04-11
Payer: MEDICARE

## 2025-05-15 ENCOUNTER — PATIENT OUTREACH (OUTPATIENT)
Dept: PRIMARY CARE | Facility: CLINIC | Age: 75
End: 2025-05-15
Payer: MEDICARE

## 2025-05-15 DIAGNOSIS — D50.0 ANEMIA DUE TO BLOOD LOSS: ICD-10-CM

## 2025-05-15 DIAGNOSIS — K92.2 GASTROINTESTINAL HEMORRHAGE, UNSPECIFIED GASTROINTESTINAL HEMORRHAGE TYPE: ICD-10-CM

## 2025-06-13 ENCOUNTER — PATIENT OUTREACH (OUTPATIENT)
Dept: PRIMARY CARE | Facility: CLINIC | Age: 75
End: 2025-06-13
Payer: MEDICARE

## 2025-09-29 ENCOUNTER — APPOINTMENT (OUTPATIENT)
Dept: PRIMARY CARE | Facility: CLINIC | Age: 75
End: 2025-09-29
Payer: MEDICARE